# Patient Record
Sex: FEMALE | Race: WHITE | NOT HISPANIC OR LATINO | Employment: STUDENT | URBAN - METROPOLITAN AREA
[De-identification: names, ages, dates, MRNs, and addresses within clinical notes are randomized per-mention and may not be internally consistent; named-entity substitution may affect disease eponyms.]

---

## 2017-08-01 ENCOUNTER — OFFICE VISIT (OUTPATIENT)
Dept: FAMILY MEDICINE CLINIC | Facility: CLINIC | Age: 22
End: 2017-08-01

## 2017-08-01 VITALS
BODY MASS INDEX: 21.46 KG/M2 | SYSTOLIC BLOOD PRESSURE: 118 MMHG | TEMPERATURE: 98.2 F | HEIGHT: 62 IN | DIASTOLIC BLOOD PRESSURE: 72 MMHG | WEIGHT: 116.6 LBS | HEART RATE: 90 BPM | OXYGEN SATURATION: 96 %

## 2017-08-01 DIAGNOSIS — S51.851A HUMAN BITE OF FOREARM, RIGHT, INITIAL ENCOUNTER: Primary | ICD-10-CM
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
## 2017-08-01 PROCEDURE — 99213 OFFICE O/P EST LOW 20 MIN: CPT | Performed by: NURSE PRACTITIONER

## 2017-08-01 RX ORDER — CLINDAMYCIN HYDROCHLORIDE 300 MG/1
300 CAPSULE ORAL 3 TIMES DAILY
Qty: 21 CAPSULE | Refills: 0 | Status: SHIPPED | OUTPATIENT
Start: 2017-08-01 | End: 2017-08-08

## 2017-08-01 RX ORDER — CIPROFLOXACIN 500 MG/1
500 TABLET, FILM COATED ORAL 2 TIMES DAILY
Qty: 10 TABLET | Refills: 0 | Status: SHIPPED | OUTPATIENT
Start: 2017-08-01 | End: 2017-08-06

## 2017-08-01 NOTE — PROGRESS NOTES
Subjective   Alla Waldron is a 21 y.o. female.     History of Present Illness   Alla is here today with complaint of a bite wound to right forearm from a child at  yesterday at 1pm. There were visible teeth marks and redness, but no bleeding. States she is going out of town and wants to be treated because the last person that was bit by this child developed a staph infection from the bite.  Patient states she had a tetanus vaccine in January 2017.  The following portions of the patient's history were reviewed and updated as appropriate: allergies, current medications, past family history, past medical history, past social history, past surgical history and problem list.    Review of Systems   Constitutional: Negative for chills and fever.   Respiratory: Negative for shortness of breath.    Cardiovascular: Negative for chest pain.   Musculoskeletal: Negative for arthralgias, joint swelling, neck pain and neck stiffness.   Skin: Positive for wound. Negative for color change, pallor and rash.   Neurological: Negative for dizziness, light-headedness and headaches.   Psychiatric/Behavioral: The patient is not nervous/anxious.        Objective   Physical Exam   Constitutional: She is oriented to person, place, and time. She appears well-developed and well-nourished. No distress.   HENT:   Head: Normocephalic and atraumatic.   Right Ear: External ear normal.   Left Ear: External ear normal.   Eyes: No scleral icterus.   Neck: Normal range of motion. Neck supple.   Cardiovascular: Normal rate, regular rhythm and normal heart sounds.    No murmur heard.  Pulmonary/Chest: Effort normal and breath sounds normal. No respiratory distress.   Musculoskeletal: She exhibits no edema, tenderness or deformity.   Neurological: She is alert and oriented to person, place, and time.   Skin: Skin is warm and dry. There is erythema.   Right forearm with 3 teeth marks, sl. erythema and bruising around bite marks, skin does not  appear broken   Psychiatric: She has a normal mood and affect. Her behavior is normal. Judgment and thought content normal.   Vitals reviewed.      Assessment/Plan   Alla was seen today for child bite.    Diagnoses and all orders for this visit:    Human bite of forearm, right, initial encounter  -     ciprofloxacin (CIPRO) 500 MG tablet; Take 1 tablet by mouth 2 (Two) Times a Day for 5 days.  -     clindamycin (CLEOCIN) 300 MG capsule; Take 1 capsule by mouth 3 (Three) Times a Day for 7 days.        Since patient is going out of town, I did prescribe cipro and clindamycin as recommended for human bite wounds in PCN allergic patients. I discussed the side effects of these antibiotics, which does include C. Diff infection. Patient may choose to hold off on taking them if no signs of wound infection develop since it is difficult to see any break in the skin. I advised patient to come in for follow-up if signs of infection do develop and antibiotics don't help, or if fever and chills occur.    Patient advised to use backup birth control method during antibiotic use and 7 days after.   Patient was encouraged to keep me informed of any acute changes, lack of improvement, or any new concerning symptoms.  Patient verbalized understanding of instructions and denied further questions.

## 2017-09-07 ENCOUNTER — OFFICE VISIT (OUTPATIENT)
Dept: FAMILY MEDICINE CLINIC | Facility: CLINIC | Age: 22
End: 2017-09-07

## 2017-09-07 VITALS
OXYGEN SATURATION: 99 % | TEMPERATURE: 97.4 F | BODY MASS INDEX: 21.38 KG/M2 | HEIGHT: 62 IN | DIASTOLIC BLOOD PRESSURE: 76 MMHG | WEIGHT: 116.2 LBS | HEART RATE: 68 BPM | SYSTOLIC BLOOD PRESSURE: 102 MMHG

## 2017-09-07 DIAGNOSIS — Z01.89 VISIT FOR LABORATORY TEST: ICD-10-CM

## 2017-09-07 DIAGNOSIS — R20.0 NUMBNESS IN RIGHT LEG: Primary | ICD-10-CM

## 2017-09-07 DIAGNOSIS — R20.2 NUMBNESS AND TINGLING IN LEFT ARM: ICD-10-CM

## 2017-09-07 DIAGNOSIS — R20.0 NUMBNESS AND TINGLING IN LEFT ARM: ICD-10-CM

## 2017-09-07 PROCEDURE — 99213 OFFICE O/P EST LOW 20 MIN: CPT | Performed by: NURSE PRACTITIONER

## 2017-09-07 RX ORDER — LORATADINE 10 MG/1
CAPSULE, LIQUID FILLED ORAL
COMMUNITY

## 2017-09-07 NOTE — PROGRESS NOTES
Chief Complaint   Patient presents with   • Tingling     In left arm & right leg       Subjective   Alla Waldron is a 22 y.o. female    History of Present Illness  Patient with c/o tingling sensation in Left arm-started yesterday, Like when arm is asleep and hard to awaken. Occurred all day. Denies hx of neck pain or injuries.   Also with right leg tingling off and on for past 1-2 weeks, lasting 2-3 hours at a time. Denies any recent Injuries, MVA, etc. Taken no meds for this. Tried to massage arm, helped some.     Allergies   Allergen Reactions   • Penicillins      Past Medical History:   Diagnosis Date   • Depression     for 10 mths in 2014      Past Surgical History:   Procedure Laterality Date   • WISDOM TOOTH EXTRACTION  01/01/2014     Social History     Social History   • Marital status:      Spouse name: N/A   • Number of children: N/A   • Years of education: N/A     Occupational History   • Not on file.     Social History Main Topics   • Smoking status: Never Smoker   • Smokeless tobacco: Never Used   • Alcohol use Yes      Comment: occasionally   • Drug use: No   • Sexual activity: Yes     Partners: Male     Birth control/ protection: Pill     Other Topics Concern   • Not on file     Social History Narrative        Current Outpatient Prescriptions:   •  Loratadine (CLARITIN) 10 MG capsule, Take  by mouth., Disp: , Rfl:   •  Norethin Ace-Eth Estrad-FE (MINASTRIN 24 FE PO), Take  by mouth Daily., Disp: , Rfl:      Review of Systems   Constitutional: Negative for chills and fever.   Respiratory: Negative for cough, shortness of breath and wheezing.    Cardiovascular: Negative for chest pain and palpitations.   Gastrointestinal: Negative for constipation, diarrhea, nausea and vomiting.   Genitourinary: Negative for difficulty urinating and dysuria.   Musculoskeletal: Negative for arthralgias, back pain and neck pain.   Neurological: Positive for numbness. Negative for light-headedness and headaches.    Psychiatric/Behavioral: Negative for sleep disturbance.       Objective     Vitals:    09/07/17 1106   BP: 102/76   Pulse: 68   Temp: 97.4 °F (36.3 °C)   SpO2: 99%       No results found for this or any previous visit.    Physical Exam   Constitutional: She is oriented to person, place, and time. She appears well-developed and well-nourished.   HENT:   Head: Normocephalic.   Eyes: EOM are normal. Pupils are equal, round, and reactive to light.   Neck: Normal range of motion. Neck supple. No muscular tenderness present. Normal range of motion present.   Cardiovascular: Normal rate, regular rhythm, normal heart sounds and intact distal pulses.    Pulmonary/Chest: Effort normal and breath sounds normal.   Neurological: She is alert and oriented to person, place, and time. She has normal strength and normal reflexes. No cranial nerve deficit or sensory deficit.   Skin: Skin is warm and dry.   Psychiatric: She has a normal mood and affect. Her behavior is normal.   Vitals reviewed.      Assessment/Plan   Problem List Items Addressed This Visit        Nervous and Auditory    Numbness and tingling in left arm    Numbness in right leg - Primary      Other Visit Diagnoses     Visit for laboratory test        Relevant Orders    Comprehensive Metabolic Panel    Lipid Panel      Discussed numbness in different extremities, reviewed family history, no history noted of premature ASHD. Will trial of ALEVE 2 tabs bid with food, and recommend exercise. Will check lipids and CMP since we have never checked.    Patient was encouraged to keep me informed of any acute changes, lack of improvement, or any new concerning symptoms.Plan of care discussed with pt. They verbalized understanding and agreement.   Counseling provided on Neck Exercises.

## 2017-09-07 NOTE — PATIENT INSTRUCTIONS
Neck Exercises  Neck exercises can be important for many reasons:   · They can help you to improve and maintain flexibility in your neck. This can be especially important as you age.  · They can help to make your neck stronger. This can make movement easier.  · They can reduce or prevent neck pain.  · They may help your upper back.  Ask your health care provider which neck exercises would be best for you.  EXERCISES TO IMPROVE NECK FLEXIBILITY  Neck Stretch  Repeat this exercise 3-5 times.   1. Do this exercise while standing or while sitting in a chair.  2. Place your feet flat on the floor, shoulder-width apart.  3. Slowly turn your head to the right. Turn it all the way to the right so you can look over your right shoulder. Do not tilt or tip your head.  4. Hold this position for 10-30 seconds.  5. Slowly turn your head to the left, to look over your left shoulder.  6. Hold this position for 10-30 seconds.  Neck Retraction  Repeat this exercise 8-10 times. Do this 3-4 times a day or as told by your health care provider.  1. Do this exercise while standing or while sitting in a sturdy chair.  2. Look straight ahead. Do not bend your neck.  3. Use your fingers to push your chin backward. Do not bend your neck for this movement. Continue to face straight ahead. If you are doing the exercise properly, you will feel a slight sensation in your throat and a stretch at the back of your neck.  4. Hold the stretch for 1-2 seconds. Relax and repeat.  EXERCISES TO IMPROVE NECK STRENGTH  Neck Press   Repeat this exercise 10 times. Do it first thing in the morning and right before bed or as told by your health care provider.  1. Lie on your back on a firm bed or on the floor with a pillow under your head.  2. Use your neck muscles to push your head down on the pillow and straighten your spine.  3. Hold the position as well as you can. Keep your head facing up and your chin tucked.  4. Slowly count to 5 while holding this  position.  5. Relax for a few seconds. Then repeat.  Isometric Strengthening  Do a full set of these exercises 2 times a day or as told by your health care provider.  1. Sit in a supportive chair and place your hand on your forehead.  2. Push forward with your head and neck while pushing back with your hand. Hold for 10 seconds.  3. Relax. Then repeat the exercise 3 times.  4. Next, do the sequence again, this time putting your hand against the back of your head. Use your head and neck to push backward against the hand pressure.  5. Finally, do the same exercise on either side of your head, pushing sideways against the pressure of your hand.  Prone Head Lifts  Repeat this exercise 5 times. Do this 2 times a day or as told by your health care provider.  1. Lie face-down, resting on your elbows so that your chest and upper back are raised.  2. Start with your head facing downward, near your chest. Position your chin either on or near your chest.  3. Slowly lift your head upward. Lift until you are looking straight ahead. Then continue lifting your head as far back as you can stretch.  4. Hold your head up for 5 seconds. Then slowly lower it to your starting position.  Supine Head Lifts  Repeat this exercise 8-10 times. Do this 2 times a day or as told by your health care provider.  1. Lie on your back, bending your knees to point to the ceiling and keeping your feet flat on the floor.  2. Lift your head slowly off the floor, raising your chin toward your chest.  3. Hold for 5 seconds.  4. Relax and repeat.  Scapular Retraction  Repeat this exercise 5 times. Do this 2 times a day or as told by your health care provider.   1. Stand with your arms at your sides. Look straight ahead.  2. Slowly pull both shoulders backward and downward until you feel a stretch between your shoulder blades in your upper back.  3. Hold for 10-30 seconds.  4. Relax and repeat.  SEEK MEDICAL CARE IF:  · Your neck pain or discomfort gets much  worse when you do an exercise.  · Your neck pain or discomfort does not improve within 2 hours after you exercise.  If you have any of these problems, stop exercising right away. Do not do the exercises again unless your health care provider says that you can.  SEEK IMMEDIATE MEDICAL CARE IF:  · You develop sudden, severe neck pain. If this happens, stop exercising right away. Do not do the exercises again unless your health care provider says that you can.     This information is not intended to replace advice given to you by your health care provider. Make sure you discuss any questions you have with your health care provider.     Document Released: 11/28/2016 Document Reviewed: 11/28/2016  [employer] Interactive Patient Education ©2017 [employer]

## 2018-04-23 ENCOUNTER — TRANSCRIBE ORDERS (OUTPATIENT)
Dept: ADMINISTRATIVE | Facility: HOSPITAL | Age: 23
End: 2018-04-23

## 2018-04-23 DIAGNOSIS — N63.10 BREAST MASS, RIGHT: Primary | ICD-10-CM

## 2018-04-23 DIAGNOSIS — N63.11 BREAST LUMP ON RIGHT SIDE AT 11 O'CLOCK POSITION: ICD-10-CM

## 2018-04-26 ENCOUNTER — HOSPITAL ENCOUNTER (OUTPATIENT)
Dept: ULTRASOUND IMAGING | Facility: HOSPITAL | Age: 23
Discharge: HOME | End: 2018-04-26
Admitting: PHYSICIAN ASSISTANT

## 2018-04-26 DIAGNOSIS — N63.10 BREAST MASS, RIGHT: ICD-10-CM

## 2018-04-26 DIAGNOSIS — N63.11 BREAST LUMP ON RIGHT SIDE AT 11 O'CLOCK POSITION: ICD-10-CM

## 2018-04-26 PROCEDURE — 76642 ULTRASOUND BREAST LIMITED: CPT | Performed by: RADIOLOGY

## 2018-04-26 PROCEDURE — 76642 ULTRASOUND BREAST LIMITED: CPT

## 2018-11-06 ENCOUNTER — OFFICE VISIT (OUTPATIENT)
Dept: FAMILY MEDICINE CLINIC | Facility: CLINIC | Age: 23
End: 2018-11-06

## 2018-11-06 VITALS
WEIGHT: 120.8 LBS | BODY MASS INDEX: 22.09 KG/M2 | SYSTOLIC BLOOD PRESSURE: 96 MMHG | OXYGEN SATURATION: 98 % | HEART RATE: 92 BPM | TEMPERATURE: 98.2 F | DIASTOLIC BLOOD PRESSURE: 70 MMHG

## 2018-11-06 DIAGNOSIS — J02.9 SORE THROAT: Primary | ICD-10-CM

## 2018-11-06 LAB
EXPIRATION DATE: NORMAL
INTERNAL CONTROL: NORMAL
Lab: NORMAL
S PYO AG THROAT QL: NEGATIVE

## 2018-11-06 PROCEDURE — 99213 OFFICE O/P EST LOW 20 MIN: CPT | Performed by: NURSE PRACTITIONER

## 2018-11-06 PROCEDURE — 87880 STREP A ASSAY W/OPTIC: CPT | Performed by: NURSE PRACTITIONER

## 2018-11-06 RX ORDER — CEFDINIR 300 MG/1
300 CAPSULE ORAL 2 TIMES DAILY
Qty: 14 CAPSULE | Refills: 0 | Status: SHIPPED | OUTPATIENT
Start: 2018-11-06 | End: 2020-10-09

## 2018-11-06 NOTE — PROGRESS NOTES
Subjective   Alla Waldron is a 23 y.o. female.   Chief Complaint   Patient presents with   • Sore Throat     Started yesterday       History of Present Illness   Patient is here with complaint of sore throat, started yesterdayalso had a low grade temp this morning, has not taken anything for it. States a couple of people at work had Strep throat.   The following portions of the patient's history were reviewed and updated as appropriate: allergies, current medications, past family history, past medical history, past social history, past surgical history and problem list.    Review of Systems   Constitutional: Positive for fatigue and fever.   HENT: Positive for ear pain (below ears at lymph nodes) and sore throat. Negative for congestion, rhinorrhea, sinus pain, trouble swallowing and voice change.    Respiratory: Negative for apnea, cough and shortness of breath.    Gastrointestinal: Negative for nausea.   Skin: Negative.    Neurological: Positive for headaches. Negative for dizziness and light-headedness.       Objective   Physical Exam   Constitutional: She is oriented to person, place, and time. She appears well-developed and well-nourished. No distress.   HENT:   Head: Normocephalic and atraumatic.   Right Ear: External ear normal.   Left Ear: External ear normal.   Mouth/Throat: Uvula is midline. Posterior oropharyngeal erythema present. No oropharyngeal exudate, posterior oropharyngeal edema or tonsillar abscesses.   Eyes: Conjunctivae are normal. No scleral icterus.   Cardiovascular: Normal rate and regular rhythm.    No murmur heard.  Pulmonary/Chest: Effort normal and breath sounds normal. No respiratory distress. She has no wheezes.   Lymphadenopathy:     She has cervical adenopathy.   Neurological: She is alert and oriented to person, place, and time.   Skin: She is not diaphoretic.   Psychiatric: She has a normal mood and affect. Her behavior is normal. Judgment and thought content normal.   Vitals  reviewed.      Assessment/Plan   Alla was seen today for sore throat.    Diagnoses and all orders for this visit:    Sore throat  -     POC Rapid Strep A  -     cefdinir (OMNICEF) 300 MG capsule; Take 1 capsule by mouth 2 (Two) Times a Day.          Results for orders placed or performed in visit on 11/06/18   POC Rapid Strep A   Result Value Ref Range    Rapid Strep A Screen Negative Negative, VALID, INVALID, Not Performed    Internal Control Passed Passed    Lot Number [id number]     Expiration Date 02/28/2019      Rapid strep negative, advised salt water gargles, hot tea with honey and lemon or cold drinks and foods if more soothing. Antibiotic prescription given, start only if no improvement in 2 days or if symptoms worsen. Advised back up birth control medthod if antibiotics are used.  Patient was encouraged to keep me informed of any acute changes, lack of improvement, or any new concerning symptoms.Patient voiced understanding of all instructions and denied further questions.

## 2018-11-06 NOTE — PATIENT INSTRUCTIONS
Sore Throat  When you have a sore throat, your throat may:  · Hurt.  · Burn.  · Feel irritated.  · Feel scratchy.    Many things can cause a sore throat, including:  · An infection.  · Allergies.  · Dryness in the air.  · Smoke or pollution.  · Gastroesophageal reflux disease (GERD).  · A tumor.    A sore throat can be the first sign of another sickness. It can happen with other problems, like coughing or a fever. Most sore throats go away without treatment.  Follow these instructions at home:  · Take over-the-counter medicines only as told by your doctor.  · Drink enough fluids to keep your pee (urine) clear or pale yellow.  · Rest when you feel you need to.  · To help with pain, try:  ? Sipping warm liquids, such as broth, herbal tea, or warm water.  ? Eating or drinking cold or frozen liquids, such as frozen ice pops.  ? Gargling with a salt-water mixture 3-4 times a day or as needed. To make a salt-water mixture, add ½-1 tsp of salt in 1 cup of warm water. Mix it until you cannot see the salt anymore.  ? Sucking on hard candy or throat lozenges.  ? Putting a cool-mist humidifier in your bedroom at night.  ? Sitting in the bathroom with the door closed for 5-10 minutes while you run hot water in the shower.  · Do not use any tobacco products, such as cigarettes, chewing tobacco, and e-cigarettes. If you need help quitting, ask your doctor.  Contact a doctor if:  · You have a fever for more than 2-3 days.  · You keep having symptoms for more than 2-3 days.  · Your throat does not get better in 7 days.  · You have a fever and your symptoms suddenly get worse.  Get help right away if:  · You have trouble breathing.  · You cannot swallow fluids, soft foods, or your saliva.  · You have swelling in your throat or neck that gets worse.  · You keep feeling like you are going to throw up (vomit).  · You keep throwing up.  This information is not intended to replace advice given to you by your health care provider. Make  sure you discuss any questions you have with your health care provider.  Document Released: 09/26/2009 Document Revised: 08/13/2017 Document Reviewed: 10/07/2016  [employer] Interactive Patient Education © 2018 [employer]    Start antibiotic if symptoms worsen or do not improve in 2 days  Drink plenty of fluids, salt water gargles, tea with lemon and honey  Use alternate birth control/condoms while on antibiotics and for 7 days after since antibiotics can reduce effectiveness of birth control pills

## 2020-10-09 ENCOUNTER — OFFICE VISIT (OUTPATIENT)
Dept: FAMILY MEDICINE CLINIC | Facility: CLINIC | Age: 25
End: 2020-10-09

## 2020-10-09 ENCOUNTER — LAB (OUTPATIENT)
Dept: LAB | Facility: HOSPITAL | Age: 25
End: 2020-10-09

## 2020-10-09 VITALS
DIASTOLIC BLOOD PRESSURE: 80 MMHG | HEART RATE: 73 BPM | TEMPERATURE: 97.1 F | SYSTOLIC BLOOD PRESSURE: 120 MMHG | BODY MASS INDEX: 22.45 KG/M2 | WEIGHT: 122 LBS | OXYGEN SATURATION: 99 % | HEIGHT: 62 IN

## 2020-10-09 DIAGNOSIS — Z11.1 SCREENING-PULMONARY TB: Primary | ICD-10-CM

## 2020-10-09 DIAGNOSIS — R79.89 ELEVATED TSH: ICD-10-CM

## 2020-10-09 DIAGNOSIS — Z11.59 ENCOUNTER FOR HEPATITIS C SCREENING TEST FOR LOW RISK PATIENT: ICD-10-CM

## 2020-10-09 DIAGNOSIS — Z00.00 HEALTHCARE MAINTENANCE: ICD-10-CM

## 2020-10-09 DIAGNOSIS — Z23 NEED FOR TETANUS, DIPHTHERIA, AND ACELLULAR PERTUSSIS (TDAP) VACCINE: ICD-10-CM

## 2020-10-09 DIAGNOSIS — Z11.1 SCREENING-PULMONARY TB: ICD-10-CM

## 2020-10-09 LAB
25(OH)D3 SERPL-MCNC: 40.9 NG/ML (ref 30–100)
ALBUMIN SERPL-MCNC: 4.5 G/DL (ref 3.5–5.2)
ALBUMIN/GLOB SERPL: 1.6 G/DL
ALP SERPL-CCNC: 55 U/L (ref 39–117)
ALT SERPL W P-5'-P-CCNC: 18 U/L (ref 1–33)
ANION GAP SERPL CALCULATED.3IONS-SCNC: 11.1 MMOL/L (ref 5–15)
AST SERPL-CCNC: 20 U/L (ref 1–32)
BASOPHILS # BLD AUTO: 0.03 10*3/MM3 (ref 0–0.2)
BASOPHILS NFR BLD AUTO: 0.5 % (ref 0–1.5)
BILIRUB SERPL-MCNC: 1.4 MG/DL (ref 0–1.2)
BUN SERPL-MCNC: 11 MG/DL (ref 6–20)
BUN/CREAT SERPL: 12 (ref 7–25)
CALCIUM SPEC-SCNC: 9.6 MG/DL (ref 8.6–10.5)
CHLORIDE SERPL-SCNC: 102 MMOL/L (ref 98–107)
CHOLEST SERPL-MCNC: 157 MG/DL (ref 0–200)
CO2 SERPL-SCNC: 24.9 MMOL/L (ref 22–29)
CREAT SERPL-MCNC: 0.92 MG/DL (ref 0.57–1)
DEPRECATED RDW RBC AUTO: 37.1 FL (ref 37–54)
EOSINOPHIL # BLD AUTO: 0.13 10*3/MM3 (ref 0–0.4)
EOSINOPHIL NFR BLD AUTO: 2.2 % (ref 0.3–6.2)
ERYTHROCYTE [DISTWIDTH] IN BLOOD BY AUTOMATED COUNT: 11.5 % (ref 12.3–15.4)
GFR SERPL CREATININE-BSD FRML MDRD: 74 ML/MIN/1.73
GLOBULIN UR ELPH-MCNC: 2.9 GM/DL
GLUCOSE SERPL-MCNC: 78 MG/DL (ref 65–99)
HCT VFR BLD AUTO: 43.4 % (ref 34–46.6)
HCV AB SER DONR QL: NORMAL
HDLC SERPL-MCNC: 47 MG/DL (ref 40–60)
HGB BLD-MCNC: 15.2 G/DL (ref 12–15.9)
IMM GRANULOCYTES # BLD AUTO: 0.01 10*3/MM3 (ref 0–0.05)
IMM GRANULOCYTES NFR BLD AUTO: 0.2 % (ref 0–0.5)
LDLC SERPL CALC-MCNC: 92 MG/DL (ref 0–100)
LDLC/HDLC SERPL: 1.93 {RATIO}
LYMPHOCYTES # BLD AUTO: 2.28 10*3/MM3 (ref 0.7–3.1)
LYMPHOCYTES NFR BLD AUTO: 39 % (ref 19.6–45.3)
MCH RBC QN AUTO: 31.3 PG (ref 26.6–33)
MCHC RBC AUTO-ENTMCNC: 35 G/DL (ref 31.5–35.7)
MCV RBC AUTO: 89.5 FL (ref 79–97)
MONOCYTES # BLD AUTO: 0.32 10*3/MM3 (ref 0.1–0.9)
MONOCYTES NFR BLD AUTO: 5.5 % (ref 5–12)
NEUTROPHILS NFR BLD AUTO: 3.08 10*3/MM3 (ref 1.7–7)
NEUTROPHILS NFR BLD AUTO: 52.6 % (ref 42.7–76)
NRBC BLD AUTO-RTO: 0 /100 WBC (ref 0–0.2)
PLATELET # BLD AUTO: 228 10*3/MM3 (ref 140–450)
PMV BLD AUTO: 11.3 FL (ref 6–12)
POTASSIUM SERPL-SCNC: 3.8 MMOL/L (ref 3.5–5.2)
PROT SERPL-MCNC: 7.4 G/DL (ref 6–8.5)
RBC # BLD AUTO: 4.85 10*6/MM3 (ref 3.77–5.28)
SODIUM SERPL-SCNC: 138 MMOL/L (ref 136–145)
TRIGL SERPL-MCNC: 97 MG/DL (ref 0–150)
TSH SERPL DL<=0.05 MIU/L-ACNC: 5.33 UIU/ML (ref 0.27–4.2)
VLDLC SERPL-MCNC: 18 MG/DL (ref 5–40)
WBC # BLD AUTO: 5.85 10*3/MM3 (ref 3.4–10.8)

## 2020-10-09 PROCEDURE — 99212 OFFICE O/P EST SF 10 MIN: CPT | Performed by: NURSE PRACTITIONER

## 2020-10-09 PROCEDURE — 86481 TB AG RESPONSE T-CELL SUSP: CPT

## 2020-10-09 PROCEDURE — 80053 COMPREHEN METABOLIC PANEL: CPT

## 2020-10-09 PROCEDURE — 80061 LIPID PANEL: CPT

## 2020-10-09 PROCEDURE — 84439 ASSAY OF FREE THYROXINE: CPT

## 2020-10-09 PROCEDURE — 85025 COMPLETE CBC W/AUTO DIFF WBC: CPT

## 2020-10-09 PROCEDURE — 82306 VITAMIN D 25 HYDROXY: CPT

## 2020-10-09 PROCEDURE — 86803 HEPATITIS C AB TEST: CPT

## 2020-10-09 PROCEDURE — 84443 ASSAY THYROID STIM HORMONE: CPT

## 2020-10-09 PROCEDURE — 90471 IMMUNIZATION ADMIN: CPT | Performed by: NURSE PRACTITIONER

## 2020-10-09 PROCEDURE — 90715 TDAP VACCINE 7 YRS/> IM: CPT | Performed by: NURSE PRACTITIONER

## 2020-10-09 RX ORDER — DESOGESTREL AND ETHINYL ESTRADIOL 0.15-0.03
KIT ORAL
COMMUNITY
Start: 2020-07-15

## 2020-10-09 NOTE — PATIENT INSTRUCTIONS
Preventive Care 21-39 Years Old, Female  Preventive care refers to visits with your health care provider and lifestyle choices that can promote health and wellness. This includes:  · A yearly physical exam. This may also be called an annual well check.  · Regular dental visits and eye exams.  · Immunizations.  · Screening for certain conditions.  · Healthy lifestyle choices, such as eating a healthy diet, getting regular exercise, not using drugs or products that contain nicotine and tobacco, and limiting alcohol use.  What can I expect for my preventive care visit?  Physical exam  Your health care provider will check your:  · Height and weight. This may be used to calculate body mass index (BMI), which tells if you are at a healthy weight.  · Heart rate and blood pressure.  · Skin for abnormal spots.  Counseling  Your health care provider may ask you questions about your:  · Alcohol, tobacco, and drug use.  · Emotional well-being.  · Home and relationship well-being.  · Sexual activity.  · Eating habits.  · Work and work environment.  · Method of birth control.  · Menstrual cycle.  · Pregnancy history.  What immunizations do I need?    Influenza (flu) vaccine  · This is recommended every year.  Tetanus, diphtheria, and pertussis (Tdap) vaccine  · You may need a Td booster every 10 years.  Varicella (chickenpox) vaccine  · You may need this if you have not been vaccinated.  Human papillomavirus (HPV) vaccine  · If recommended by your health care provider, you may need three doses over 6 months.  Measles, mumps, and rubella (MMR) vaccine  · You may need at least one dose of MMR. You may also need a second dose.  Meningococcal conjugate (MenACWY) vaccine  · One dose is recommended if you are age 19-21 years and a first-year college student living in a residence [place], or if you have one of several medical conditions. You may also need additional booster doses.  Pneumococcal conjugate (PCV13) vaccine  · You may need  this if you have certain conditions and were not previously vaccinated.  Pneumococcal polysaccharide (PPSV23) vaccine  · You may need one or two doses if you smoke cigarettes or if you have certain conditions.  Hepatitis A vaccine  · You may need this if you have certain conditions or if you travel or work in places where you may be exposed to hepatitis A.  Hepatitis B vaccine  · You may need this if you have certain conditions or if you travel or work in places where you may be exposed to hepatitis B.  Haemophilus influenzae type b (Hib) vaccine  · You may need this if you have certain conditions.  You may receive vaccines as individual doses or as more than one vaccine together in one shot (combination vaccines). Talk with your health care provider about the risks and benefits of combination vaccines.  What tests do I need?    Blood tests  · Lipid and cholesterol levels. These may be checked every 5 years starting at age 20.  · Hepatitis C test.  · Hepatitis B test.  Screening  · Diabetes screening. This is done by checking your blood sugar (glucose) after you have not eaten for a while (fasting).  · Sexually transmitted disease (STD) testing.  · BRCA-related cancer screening. This may be done if you have a family history of breast, ovarian, tubal, or peritoneal cancers.  · Pelvic exam and Pap test. This may be done every 3 years starting at age 21. Starting at age 30, this may be done every 5 years if you have a Pap test in combination with an HPV test.  Talk with your health care provider about your test results, treatment options, and if necessary, the need for more tests.  Follow these instructions at home:  Eating and drinking    · Eat a diet that includes fresh fruits and vegetables, whole grains, lean protein, and low-fat dairy.  · Take vitamin and mineral supplements as recommended by your health care provider.  · Do not drink alcohol if:  ? Your health care provider tells you not to drink.  ? You are  pregnant, may be pregnant, or are planning to become pregnant.  · If you drink alcohol:  ? Limit how much you have to 0-1 drink a day.  ? Be aware of how much alcohol is in your drink. In the [place] one drink equals one 12 oz bottle of beer (355 mL), one 5 oz glass of wine (148 mL), or one 1½ oz glass of hard liquor (44 mL).  Lifestyle  · Take daily care of your teeth and gums.  · Stay active. Exercise for at least 30 minutes on 5 or more days each week.  · Do not use any products that contain nicotine or tobacco, such as cigarettes, e-cigarettes, and chewing tobacco. If you need help quitting, ask your health care provider.  · If you are sexually active, practice safe sex. Use a condom or other form of birth control (contraception) in order to prevent pregnancy and STIs (sexually transmitted infections). If you plan to become pregnant, see your health care provider for a preconception visit.  What's next?  · Visit your health care provider once a year for a well check visit.  · Ask your health care provider how often you should have your eyes and teeth checked.  · Stay up to date on all vaccines.  This information is not intended to replace advice given to you by your health care provider. Make sure you discuss any questions you have with your health care provider.  Document Released: 02/13/2003 Document Revised: 08/29/2019 Document Reviewed: 08/29/2019  [employer] Patient Education © 2020 [employer]

## 2020-10-09 NOTE — PROGRESS NOTES
Subjective   Alla Waldron is a 25 y.o. female.   Chief Complaint   Patient presents with   • TB Test     She is requesting Quanteferon lab test to check for tuberculosis, requirement for school      Answers for HPI/ROS submitted by the patient on 10/2/2020   What is the primary reason for your visit?: Other  Please describe your symptoms.: No symptoms, I just need an updated TB test and tetanus shot  Have you had these symptoms before?: No  How long have you been having these symptoms?: 1-4 days  Please list any medications you are currently taking for this condition.: claritin qd, apri qd      History of Present Illness   Patient is here for TB screening and routine labs. 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 She also needs to update her Tdap vaccination. Computer entry shows it was given in 2017, she does not have that record and does not recall getting the vaccine. so needs to go ahead and get today.   Eats healthy diet, gets regular exercise. Denies health concerns. Denies history of positive TB testing.    The following portions of the patient's history were reviewed and updated as appropriate: allergies, current medications, past family history, past medical history, past social history, past surgical history and problem list.    Review of Systems   Constitutional: Negative for activity change, appetite change, chills, diaphoresis, fatigue, fever and unexpected weight change.   Eyes: Negative for visual disturbance.   Respiratory: Negative for cough, shortness of breath and wheezing.    Cardiovascular: Negative for chest pain, palpitations and leg swelling.   Gastrointestinal: Negative for abdominal pain, blood in stool, constipation and diarrhea.   Endocrine: Negative for cold intolerance and heat intolerance.   Genitourinary: Negative for difficulty urinating, dysuria and menstrual problem.   Skin: Negative for rash and wound.   Neurological: Negative for dizziness, light-headedness, numbness and  headaches.   Psychiatric/Behavioral: Negative for dysphoric mood and sleep disturbance. The patient is not nervous/anxious.        Objective   Physical Exam  Vitals signs reviewed.   Constitutional:       General: She is not in acute distress.     Appearance: Normal appearance. She is normal weight. She is not ill-appearing, toxic-appearing or diaphoretic.   HENT:      Head: Normocephalic and atraumatic.   Neck:      Musculoskeletal: Normal range of motion and neck supple.   Cardiovascular:      Rate and Rhythm: Normal rate and regular rhythm.      Pulses: Normal pulses.      Heart sounds: Normal heart sounds. No murmur.   Pulmonary:      Effort: Pulmonary effort is normal. No respiratory distress.      Breath sounds: Normal breath sounds. No wheezing.   Abdominal:      General: Abdomen is flat.      Palpations: Abdomen is soft.   Skin:     General: Skin is warm and dry.   Neurological:      Mental Status: She is alert and oriented to person, place, and time.   Psychiatric:         Mood and Affect: Mood normal.         Thought Content: Thought content normal.       /80   Pulse 73   Temp 97.1 °F (36.2 °C)   Wt 55.3 kg (122 lb)   SpO2 99%   BMI 22.31 kg/m²     Assessment/Plan   Alla was seen today for tb test.    Diagnoses and all orders for this visit:    Screening-pulmonary TB  -     Cancel: QuantiFERON TB Gold; Future  -     TSPOT; Future    Healthcare maintenance  -     CBC Auto Differential; Future  -     Comprehensive Metabolic Panel; Future  -     Lipid Panel; Future  -     Vitamin D 25 Hydroxy; Future  -     TSH; Future    Encounter for hepatitis C screening test for low risk patient  -     Hepatitis C Antibody; Future    Need for tetanus, diphtheria, and acellular pertussis (Tdap) vaccine  -     Tdap Vaccine Greater Than or Equal To 8yo IM      Tspot ordered for TB screening, will do Quantiferon if positive.  Tdap given today.  Screening labs ordered to evaluate chronic conditions. I will contact  patient regarding test results and provide instructions regarding any necessary changes in plan of care.  Patient was encouraged to keep me informed of any acute changes, lack of improvement, or any new concerning symptoms.

## 2020-10-10 LAB — T4 FREE SERPL-MCNC: 1.24 NG/DL (ref 0.93–1.7)

## 2020-10-11 LAB
TSPOT INTERPRETATION: NEGATIVE
TSPOT NIL CONTROL INTERPRETATION: NORMAL
TSPOT PANEL A: 0
TSPOT PANEL B: 1
TSPOT POS CONTROL INTERPRETATION: NORMAL

## 2022-01-28 ENCOUNTER — LAB (OUTPATIENT)
Dept: LAB | Facility: HOSPITAL | Age: 27
End: 2022-01-28

## 2022-01-28 ENCOUNTER — OFFICE VISIT (OUTPATIENT)
Dept: FAMILY MEDICINE CLINIC | Facility: CLINIC | Age: 27
End: 2022-01-28

## 2022-01-28 VITALS
WEIGHT: 123 LBS | DIASTOLIC BLOOD PRESSURE: 62 MMHG | OXYGEN SATURATION: 98 % | SYSTOLIC BLOOD PRESSURE: 110 MMHG | HEIGHT: 65 IN | BODY MASS INDEX: 20.49 KG/M2 | HEART RATE: 78 BPM

## 2022-01-28 DIAGNOSIS — R79.89 ELEVATED TSH: ICD-10-CM

## 2022-01-28 DIAGNOSIS — E04.1 SOLITARY THYROID NODULE: ICD-10-CM

## 2022-01-28 DIAGNOSIS — R55 VASOVAGAL NEAR SYNCOPE: ICD-10-CM

## 2022-01-28 DIAGNOSIS — Z00.00 HEALTHCARE MAINTENANCE: ICD-10-CM

## 2022-01-28 DIAGNOSIS — Z00.00 HEALTHCARE MAINTENANCE: Primary | ICD-10-CM

## 2022-01-28 DIAGNOSIS — E06.3 HYPOTHYROIDISM DUE TO HASHIMOTO'S THYROIDITIS: ICD-10-CM

## 2022-01-28 DIAGNOSIS — R17 ELEVATED BILIRUBIN: ICD-10-CM

## 2022-01-28 DIAGNOSIS — E55.9 VITAMIN D DEFICIENCY: ICD-10-CM

## 2022-01-28 DIAGNOSIS — R19.7 VOMITING AND DIARRHEA: ICD-10-CM

## 2022-01-28 DIAGNOSIS — E03.8 HYPOTHYROIDISM DUE TO HASHIMOTO'S THYROIDITIS: ICD-10-CM

## 2022-01-28 DIAGNOSIS — R11.10 VOMITING AND DIARRHEA: ICD-10-CM

## 2022-01-28 DIAGNOSIS — E06.3 THYROIDITIS, AUTOIMMUNE: ICD-10-CM

## 2022-01-28 LAB
25(OH)D3 SERPL-MCNC: 33.9 NG/ML (ref 30–100)
ALBUMIN SERPL-MCNC: 4.5 G/DL (ref 3.5–5.2)
ALBUMIN/GLOB SERPL: 1.6 G/DL
ALP SERPL-CCNC: 55 U/L (ref 39–117)
ALT SERPL W P-5'-P-CCNC: 11 U/L (ref 1–33)
ANION GAP SERPL CALCULATED.3IONS-SCNC: 12.3 MMOL/L (ref 5–15)
AST SERPL-CCNC: 17 U/L (ref 1–32)
BASOPHILS # BLD AUTO: 0.04 10*3/MM3 (ref 0–0.2)
BASOPHILS NFR BLD AUTO: 0.6 % (ref 0–1.5)
BILIRUB SERPL-MCNC: 2.6 MG/DL (ref 0–1.2)
BUN SERPL-MCNC: 12 MG/DL (ref 6–20)
BUN/CREAT SERPL: 13 (ref 7–25)
CALCIUM SPEC-SCNC: 9.8 MG/DL (ref 8.6–10.5)
CHLORIDE SERPL-SCNC: 105 MMOL/L (ref 98–107)
CHOLEST SERPL-MCNC: 148 MG/DL (ref 0–200)
CO2 SERPL-SCNC: 23.7 MMOL/L (ref 22–29)
CREAT SERPL-MCNC: 0.92 MG/DL (ref 0.57–1)
DEPRECATED RDW RBC AUTO: 40 FL (ref 37–54)
EOSINOPHIL # BLD AUTO: 0.11 10*3/MM3 (ref 0–0.4)
EOSINOPHIL NFR BLD AUTO: 1.5 % (ref 0.3–6.2)
ERYTHROCYTE [DISTWIDTH] IN BLOOD BY AUTOMATED COUNT: 11.7 % (ref 12.3–15.4)
GFR SERPL CREATININE-BSD FRML MDRD: 74 ML/MIN/1.73
GLOBULIN UR ELPH-MCNC: 2.9 GM/DL
GLUCOSE SERPL-MCNC: 78 MG/DL (ref 65–99)
HCT VFR BLD AUTO: 44.2 % (ref 34–46.6)
HDLC SERPL-MCNC: 50 MG/DL (ref 40–60)
HGB BLD-MCNC: 14.8 G/DL (ref 12–15.9)
IMM GRANULOCYTES # BLD AUTO: 0.02 10*3/MM3 (ref 0–0.05)
IMM GRANULOCYTES NFR BLD AUTO: 0.3 % (ref 0–0.5)
LDLC SERPL CALC-MCNC: 82 MG/DL (ref 0–100)
LDLC/HDLC SERPL: 1.61 {RATIO}
LYMPHOCYTES # BLD AUTO: 2.59 10*3/MM3 (ref 0.7–3.1)
LYMPHOCYTES NFR BLD AUTO: 35.9 % (ref 19.6–45.3)
MCH RBC QN AUTO: 30.8 PG (ref 26.6–33)
MCHC RBC AUTO-ENTMCNC: 33.5 G/DL (ref 31.5–35.7)
MCV RBC AUTO: 91.9 FL (ref 79–97)
MONOCYTES # BLD AUTO: 0.44 10*3/MM3 (ref 0.1–0.9)
MONOCYTES NFR BLD AUTO: 6.1 % (ref 5–12)
NEUTROPHILS NFR BLD AUTO: 4.01 10*3/MM3 (ref 1.7–7)
NEUTROPHILS NFR BLD AUTO: 55.6 % (ref 42.7–76)
NRBC BLD AUTO-RTO: 0 /100 WBC (ref 0–0.2)
PLATELET # BLD AUTO: 238 10*3/MM3 (ref 140–450)
PMV BLD AUTO: 11.4 FL (ref 6–12)
POTASSIUM SERPL-SCNC: 4.4 MMOL/L (ref 3.5–5.2)
PROT SERPL-MCNC: 7.4 G/DL (ref 6–8.5)
RBC # BLD AUTO: 4.81 10*6/MM3 (ref 3.77–5.28)
SODIUM SERPL-SCNC: 141 MMOL/L (ref 136–145)
T4 FREE SERPL-MCNC: 1.43 NG/DL (ref 0.93–1.7)
TRIGL SERPL-MCNC: 87 MG/DL (ref 0–150)
TSH SERPL DL<=0.05 MIU/L-ACNC: 7.08 UIU/ML (ref 0.27–4.2)
VLDLC SERPL-MCNC: 16 MG/DL (ref 5–40)
WBC NRBC COR # BLD: 7.21 10*3/MM3 (ref 3.4–10.8)

## 2022-01-28 PROCEDURE — 86376 MICROSOMAL ANTIBODY EACH: CPT

## 2022-01-28 PROCEDURE — 36415 COLL VENOUS BLD VENIPUNCTURE: CPT

## 2022-01-28 PROCEDURE — 80061 LIPID PANEL: CPT

## 2022-01-28 PROCEDURE — 80050 GENERAL HEALTH PANEL: CPT

## 2022-01-28 PROCEDURE — 84439 ASSAY OF FREE THYROXINE: CPT

## 2022-01-28 PROCEDURE — 82306 VITAMIN D 25 HYDROXY: CPT

## 2022-01-28 PROCEDURE — 99214 OFFICE O/P EST MOD 30 MIN: CPT | Performed by: NURSE PRACTITIONER

## 2022-01-29 DIAGNOSIS — E06.3 THYROIDITIS, AUTOIMMUNE: ICD-10-CM

## 2022-01-29 DIAGNOSIS — R17 ELEVATED BILIRUBIN: Primary | ICD-10-CM

## 2022-01-29 LAB — THYROPEROXIDASE AB SERPL-ACNC: 304 IU/ML (ref 0–34)

## 2022-01-29 RX ORDER — LEVOTHYROXINE SODIUM 0.05 MG/1
50 TABLET ORAL DAILY
Qty: 60 TABLET | Refills: 0 | Status: SHIPPED | OUTPATIENT
Start: 2022-01-29 | End: 2022-04-01

## 2022-02-08 ENCOUNTER — HOSPITAL ENCOUNTER (OUTPATIENT)
Dept: ULTRASOUND IMAGING | Facility: HOSPITAL | Age: 27
Discharge: HOME | End: 2022-02-08

## 2022-02-08 DIAGNOSIS — R79.89 ELEVATED TSH: ICD-10-CM

## 2022-02-08 DIAGNOSIS — R19.7 VOMITING AND DIARRHEA: ICD-10-CM

## 2022-02-08 DIAGNOSIS — R17 ELEVATED BILIRUBIN: ICD-10-CM

## 2022-02-08 DIAGNOSIS — E06.3 HYPOTHYROIDISM DUE TO HASHIMOTO'S THYROIDITIS: ICD-10-CM

## 2022-02-08 DIAGNOSIS — R11.10 VOMITING AND DIARRHEA: ICD-10-CM

## 2022-02-08 DIAGNOSIS — E03.8 HYPOTHYROIDISM DUE TO HASHIMOTO'S THYROIDITIS: ICD-10-CM

## 2022-02-08 PROCEDURE — 76536 US EXAM OF HEAD AND NECK: CPT

## 2022-02-08 PROCEDURE — 76705 ECHO EXAM OF ABDOMEN: CPT

## 2022-03-11 ENCOUNTER — LAB (OUTPATIENT)
Dept: LAB | Facility: HOSPITAL | Age: 27
End: 2022-03-11

## 2022-03-11 DIAGNOSIS — R17 ELEVATED BILIRUBIN: ICD-10-CM

## 2022-03-11 DIAGNOSIS — E06.3 THYROIDITIS, AUTOIMMUNE: ICD-10-CM

## 2022-03-11 LAB
BILIRUB CONJ SERPL-MCNC: 0.3 MG/DL (ref 0–0.3)
BILIRUB INDIRECT SERPL-MCNC: 1.2 MG/DL
BILIRUB SERPL-MCNC: 1.5 MG/DL (ref 0–1.2)
TSH SERPL DL<=0.05 MIU/L-ACNC: 2.56 UIU/ML (ref 0.27–4.2)

## 2022-03-11 PROCEDURE — 82247 BILIRUBIN TOTAL: CPT

## 2022-03-11 PROCEDURE — 82248 BILIRUBIN DIRECT: CPT

## 2022-03-11 PROCEDURE — 84443 ASSAY THYROID STIM HORMONE: CPT

## 2022-03-31 DIAGNOSIS — E06.3 HYPOTHYROIDISM DUE TO HASHIMOTO'S THYROIDITIS: ICD-10-CM

## 2022-03-31 DIAGNOSIS — E03.8 HYPOTHYROIDISM DUE TO HASHIMOTO'S THYROIDITIS: ICD-10-CM

## 2022-04-01 RX ORDER — LEVOTHYROXINE SODIUM 0.05 MG/1
TABLET ORAL
Qty: 30 TABLET | Refills: 2 | Status: SHIPPED | OUTPATIENT
Start: 2022-04-01 | End: 2022-07-01

## 2022-04-01 NOTE — TELEPHONE ENCOUNTER
Rx Refill Note  Requested Prescriptions     Pending Prescriptions Disp Refills   • levothyroxine (SYNTHROID, LEVOTHROID) 50 MCG tablet [Pharmacy Med Name: LEVOTHYROXINE 50 MCG TABLET] 30 tablet      Sig: TAKE ONE TABLET BY MOUTH DAILY      Last office visit with prescribing clinician: 1/28/2022      Next office visit with prescribing clinician: Visit date not found            Shena Oneill MA  04/01/22, 08:38 EDT

## 2022-05-16 ENCOUNTER — OFFICE VISIT (OUTPATIENT)
Dept: ENDOCRINOLOGY | Facility: CLINIC | Age: 27
End: 2022-05-16

## 2022-05-16 ENCOUNTER — LAB (OUTPATIENT)
Dept: LAB | Facility: HOSPITAL | Age: 27
End: 2022-05-16

## 2022-05-16 VITALS
HEIGHT: 62 IN | WEIGHT: 120 LBS | OXYGEN SATURATION: 98 % | DIASTOLIC BLOOD PRESSURE: 60 MMHG | SYSTOLIC BLOOD PRESSURE: 110 MMHG | BODY MASS INDEX: 22.08 KG/M2 | HEART RATE: 102 BPM

## 2022-05-16 DIAGNOSIS — E03.8 HYPOTHYROIDISM DUE TO HASHIMOTO'S THYROIDITIS: Primary | ICD-10-CM

## 2022-05-16 DIAGNOSIS — E06.3 HYPOTHYROIDISM DUE TO HASHIMOTO'S THYROIDITIS: Primary | ICD-10-CM

## 2022-05-16 DIAGNOSIS — R19.7 DIARRHEA, UNSPECIFIED TYPE: ICD-10-CM

## 2022-05-16 DIAGNOSIS — E04.1 SOLITARY THYROID NODULE: ICD-10-CM

## 2022-05-16 PROCEDURE — 86231 EMA EACH IG CLASS: CPT | Performed by: INTERNAL MEDICINE

## 2022-05-16 PROCEDURE — 84443 ASSAY THYROID STIM HORMONE: CPT | Performed by: INTERNAL MEDICINE

## 2022-05-16 PROCEDURE — 86364 TISS TRNSGLTMNASE EA IG CLAS: CPT | Performed by: INTERNAL MEDICINE

## 2022-05-16 PROCEDURE — 99204 OFFICE O/P NEW MOD 45 MIN: CPT | Performed by: INTERNAL MEDICINE

## 2022-05-16 PROCEDURE — 82784 ASSAY IGA/IGD/IGG/IGM EACH: CPT | Performed by: INTERNAL MEDICINE

## 2022-05-16 PROCEDURE — 86258 DGP ANTIBODY EACH IG CLASS: CPT | Performed by: INTERNAL MEDICINE

## 2022-05-16 PROCEDURE — 84439 ASSAY OF FREE THYROXINE: CPT | Performed by: INTERNAL MEDICINE

## 2022-05-16 NOTE — PROGRESS NOTES
Chief Complaint   Patient presents with   • Thyroid Problem        Referring Provider  Loren Siegel APRN HPI   Alla Waldron is a 26 y.o. female had concerns including Thyroid Problem.     Hypothyroidism was diagnosed the end of January.  Patient was having episodes of nausea and diarrhea.  PCP checked labs and TSH was elevated.  Recheck level a few months later confirmed hypothyroidism with positive TPO antibody.  Has some fatigue - is improved but not to baseline.     Has a history of constipation which is unchanged.   Last week had an episode of diarrhea persisting for days. Unsure if related to stress from school ([employer] school).  No prior known issue of celiac disease or gluten intolerance. Hasn't been screened for celiac disease.     Takes the levothyroxine in the morning with OCP, denies missed doses.   No immediate plans for children. May consider in the future.     Past Medical History:   Diagnosis Date   • Depression     for 10 mths in 2014   • Hashimoto's thyroiditis 1/28/22   • Hypothyroidism 10/19/20    Elevated TSH, but normal T4   • Solitary thyroid nodule 5/16/2022   • Thyroid nodule 2/8/22    11mm nodule left lobe     Past Surgical History:   Procedure Laterality Date   • WISDOM TOOTH EXTRACTION  01/01/2014      Family History   Problem Relation Age of Onset   • Depression Mother    • Hyperlipidemia Mother    • Thyroid disease Mother         hypothyroidism   • Hypertension Mother    • Depression Father    • Breast cancer Paternal Grandmother    • Ovarian cancer Neg Hx       Social History     Socioeconomic History   • Marital status:    Tobacco Use   • Smoking status: Never Smoker   • Smokeless tobacco: Never Used   Substance and Sexual Activity   • Alcohol use: Yes     Alcohol/week: 2.0 standard drinks     Types: 2 Glasses of wine per week     Comment: occasionally   • Drug use: No   • Sexual activity: Yes     Partners: Male     Birth control/protection: Condom, OCP      Allergies  "  Allergen Reactions   • Penicillins       Current Outpatient Medications on File Prior to Visit   Medication Sig Dispense Refill   • Apri 0.15-30 MG-MCG per tablet      • levothyroxine (SYNTHROID, LEVOTHROID) 50 MCG tablet TAKE ONE TABLET BY MOUTH DAILY 30 tablet 2   • Loratadine 10 MG capsule Take  by mouth.     • Multiple Vitamins-Minerals (MULTIVITAMIN ADULTS PO) Take  by mouth Daily.       No current facility-administered medications on file prior to visit.        Review of Systems   Constitutional: Positive for fatigue.   HENT: Positive for congestion, sinus pressure and sneezing.    Eyes: Negative.    Respiratory: Negative.    Cardiovascular: Negative.    Gastrointestinal: Negative.    Endocrine: Negative.    Genitourinary: Negative.    Musculoskeletal: Negative.    Skin: Negative.    Allergic/Immunologic: Positive for environmental allergies.   Neurological: Negative.    Hematological: Negative.    Psychiatric/Behavioral: Negative.         /60   Pulse 102   Ht 157.5 cm (62\")   Wt 54.4 kg (120 lb)   SpO2 98%   BMI 21.95 kg/m²      Physical Exam    Constitutional:  well developed; well nourished  no acute distress   ENT/Thyroid: no thyromegaly  no palpable nodules   Eyes: EOM intact  Conjunctiva: clear   Respiratory:  breathing is unlabored  clear to auscultation bilaterally   Cardiovascular:  regular rate and rhythm, S1, S2 normal, no murmur, click, rub or gallop   Chest:  Not performed.   Abdomen: Not performed.   : Not performed.   Musculoskeletal: negative findings:  ROM of all joints is normal, no deformities present   Skin: dry and warm   Neuro: normal without focal findings and mental status, speech normal, alert and oriented x3   Psych: oriented to time, place and person, mood and affect are within normal limits     Labs/Imaging  CMP  Lab Results   Component Value Date    GLUCOSE 78 01/28/2022    BUN 12 01/28/2022    CREATININE 0.92 01/28/2022    EGFRIFNONA 74 01/28/2022    BCR 13.0 " 01/28/2022    K 4.4 01/28/2022    CO2 23.7 01/28/2022    CALCIUM 9.8 01/28/2022    ALBUMIN 4.50 01/28/2022    AST 17 01/28/2022    ALT 11 01/28/2022        CBC w/DIFF   Lab Results   Component Value Date    WBC 7.21 01/28/2022    RBC 4.81 01/28/2022    HGB 14.8 01/28/2022    HCT 44.2 01/28/2022    MCV 91.9 01/28/2022    MCH 30.8 01/28/2022    MCHC 33.5 01/28/2022    RDW 11.7 (L) 01/28/2022    RDWSD 40.0 01/28/2022    MPV 11.4 01/28/2022     01/28/2022    NEUTRORELPCT 55.6 01/28/2022    LYMPHORELPCT 35.9 01/28/2022    MONORELPCT 6.1 01/28/2022    EOSRELPCT 1.5 01/28/2022    BASORELPCT 0.6 01/28/2022    AUTOIGPER 0.3 01/28/2022    NEUTROABS 4.01 01/28/2022    LYMPHSABS 2.59 01/28/2022    MONOSABS 0.44 01/28/2022    EOSABS 0.11 01/28/2022    BASOSABS 0.04 01/28/2022    AUTOIGNUM 0.02 01/28/2022    NRBC 0.0 01/28/2022       TSH  Lab Results   Component Value Date    TSH 2.560 03/11/2022    TSH 7.080 (H) 01/28/2022    TSH 5.330 (H) 10/09/2020       T4  Lab Results   Component Value Date    FREET4 1.43 01/28/2022    FREET4 1.24 10/09/2020       TPO  Lab Results   Component Value Date    THYROIDAB 304 (H) 01/28/2022     EXAMINATION: US THYROID-      INDICATION: elevated TSH, elevated thyroid peroxidase antibodies;  R79.89-Other specified abnormal findings of blood chemistry; E03.8-Other  specified hypothyroidism; E06.3-Autoimmune thyroiditis      COMPARISON: NONE     FINDINGS: Sonographic grayscale and color Doppler evaluation of the  thyroid demonstrates     Right thyroid lobe measures 4.9 x 1.2 x 0.2 cm. The gland is  heterogeneous without dominant suspicious focal nodule.     Left thyroid lobe measures 5.2 x 1.3 x 1.7 cm. There is a solid  hyperechoic 1.1 x 0.8 x 1.1 cm nodule. Normal color Doppler flow.     IMPRESSION:  TI-RADS 3 solid left 11 mm thyroid lobe nodule. Recommend  follow-up in one year to document stability.     This report was finalized on 2/8/2022 3:07 PM by Cullen Godfrey.    Assessment and  "Plan    Diagnoses and all orders for this visit:    1. Hypothyroidism due to Hashimoto's thyroiditis (Primary)  Recently diagnosed, started on levothyroxine in January 2022.  On levothyroxine 50 mcg daily, clinically euthyroid though still complaining of some fatigue.  TPO antibody positive.  No need to recheck or monitor antibody level.  Recheck TFTs today and titrate levothyroxine if needed for TSH in the lower range of normal, target less than 2.5 due to potential future plans for children (not actively trying).  If doses change, recheck labs in 6 weeks.  -     T4, Free  -     TSH    2. Solitary thyroid nodule  I reviewed the ultrasound report and images.  She has a hyperechoic left 11 mm well-circumscribed nodule.  This is consistent with \"white corona\" nodule often seen with Hashimoto's.  No FNA indicated.  Repeat ultrasound in 1 year - next in February 2023. I will update in the office.     3. Diarrhea, unspecified type  Screen for celiac disease.   -     Celiac Comprehensive Panel         Return in about 4 months (around 9/16/2022) for next scheduled follow up. The patient was instructed to contact the clinic with any interval questions or concerns.    Becca Ramesh, DO   Endocrinologist    Please note that portions of this note were completed with a voice recognition program.  "

## 2022-05-17 DIAGNOSIS — E06.3 HYPOTHYROIDISM DUE TO HASHIMOTO'S THYROIDITIS: Primary | ICD-10-CM

## 2022-05-17 DIAGNOSIS — E03.8 HYPOTHYROIDISM DUE TO HASHIMOTO'S THYROIDITIS: Primary | ICD-10-CM

## 2022-05-17 LAB
T4 FREE SERPL-MCNC: 1.57 NG/DL (ref 0.93–1.7)
TSH SERPL DL<=0.05 MIU/L-ACNC: 2.04 UIU/ML (ref 0.27–4.2)

## 2022-05-18 LAB
ENDOMYSIUM IGA SER QL: NEGATIVE
GLIADIN PEPTIDE IGA SER-ACNC: 3 UNITS (ref 0–19)
GLIADIN PEPTIDE IGG SER-ACNC: 2 UNITS (ref 0–19)
IGA SERPL-MCNC: 110 MG/DL (ref 87–352)
TTG IGA SER-ACNC: <2 U/ML (ref 0–3)
TTG IGG SER-ACNC: 5 U/ML (ref 0–5)

## 2022-07-01 DIAGNOSIS — E06.3 HYPOTHYROIDISM DUE TO HASHIMOTO'S THYROIDITIS: ICD-10-CM

## 2022-07-01 DIAGNOSIS — E03.8 HYPOTHYROIDISM DUE TO HASHIMOTO'S THYROIDITIS: ICD-10-CM

## 2022-07-01 RX ORDER — LEVOTHYROXINE SODIUM 0.05 MG/1
TABLET ORAL
Qty: 30 TABLET | Refills: 0 | Status: SHIPPED | OUTPATIENT
Start: 2022-07-01 | End: 2022-08-02 | Stop reason: SDUPTHER

## 2022-08-01 DIAGNOSIS — E06.3 HYPOTHYROIDISM DUE TO HASHIMOTO'S THYROIDITIS: ICD-10-CM

## 2022-08-01 DIAGNOSIS — E03.8 HYPOTHYROIDISM DUE TO HASHIMOTO'S THYROIDITIS: ICD-10-CM

## 2022-08-02 DIAGNOSIS — E03.8 HYPOTHYROIDISM DUE TO HASHIMOTO'S THYROIDITIS: ICD-10-CM

## 2022-08-02 DIAGNOSIS — E06.3 HYPOTHYROIDISM DUE TO HASHIMOTO'S THYROIDITIS: ICD-10-CM

## 2022-08-02 RX ORDER — LEVOTHYROXINE SODIUM 0.05 MG/1
50 TABLET ORAL DAILY
Qty: 30 TABLET | Refills: 0 | OUTPATIENT
Start: 2022-08-02

## 2022-08-02 NOTE — TELEPHONE ENCOUNTER
Rx Refill Note  Requested Prescriptions     Pending Prescriptions Disp Refills   • levothyroxine (SYNTHROID, LEVOTHROID) 50 MCG tablet 30 tablet 0     Sig: Take 1 tablet by mouth Daily.      Last office visit with prescribing clinician: 1/28/2022      Next office visit with prescribing clinician: Visit date not found            Dina Brush MA  08/02/22, 08:23 EDT       Attempted to contact patient no answer left vm       Patient needs appointment      HUB MAY RELAY MESSAGE, SCHEDULE APPOINTMENT, AND DOCUMENT.

## 2022-08-02 NOTE — TELEPHONE ENCOUNTER
PATIENT CALLED IN I RELAYED THE HUB TO READ MESSAGE THE PATIENT STATES THAT SHE WILL CALL HER ENDOCRINOLOGIST SHE THINKS THAT THEY SHOULD FILL  THE MEDICATION

## 2022-08-02 NOTE — TELEPHONE ENCOUNTER
Patient called stated her PCP was filling this until she could be seen here and now her PCP wants us to send in the refill. Please advise.       Last ov 5/16/22  Follow up scheduled 9/19/22

## 2022-08-03 RX ORDER — LEVOTHYROXINE SODIUM 0.05 MG/1
50 TABLET ORAL DAILY
Qty: 30 TABLET | Refills: 1 | Status: SHIPPED | OUTPATIENT
Start: 2022-08-03 | End: 2022-08-17 | Stop reason: SDUPTHER

## 2022-08-16 ENCOUNTER — LAB (OUTPATIENT)
Dept: LAB | Facility: HOSPITAL | Age: 27
End: 2022-08-16

## 2022-08-16 DIAGNOSIS — E06.3 HYPOTHYROIDISM DUE TO HASHIMOTO'S THYROIDITIS: ICD-10-CM

## 2022-08-16 DIAGNOSIS — E03.8 HYPOTHYROIDISM DUE TO HASHIMOTO'S THYROIDITIS: ICD-10-CM

## 2022-08-16 LAB
T4 FREE SERPL-MCNC: 1.32 NG/DL (ref 0.93–1.7)
TSH SERPL DL<=0.05 MIU/L-ACNC: 4.26 UIU/ML (ref 0.27–4.2)

## 2022-08-16 PROCEDURE — 84439 ASSAY OF FREE THYROXINE: CPT

## 2022-08-16 PROCEDURE — 84443 ASSAY THYROID STIM HORMONE: CPT

## 2022-08-16 PROCEDURE — 36415 COLL VENOUS BLD VENIPUNCTURE: CPT

## 2022-08-17 DIAGNOSIS — E03.8 HYPOTHYROIDISM DUE TO HASHIMOTO'S THYROIDITIS: ICD-10-CM

## 2022-08-17 DIAGNOSIS — E06.3 HYPOTHYROIDISM DUE TO HASHIMOTO'S THYROIDITIS: ICD-10-CM

## 2022-08-17 RX ORDER — LEVOTHYROXINE SODIUM 0.07 MG/1
75 TABLET ORAL DAILY
Qty: 90 TABLET | Refills: 1 | Status: SHIPPED | OUTPATIENT
Start: 2022-08-17 | End: 2022-09-19 | Stop reason: SDUPTHER

## 2022-09-19 ENCOUNTER — LAB (OUTPATIENT)
Dept: LAB | Facility: HOSPITAL | Age: 27
End: 2022-09-19

## 2022-09-19 ENCOUNTER — OFFICE VISIT (OUTPATIENT)
Dept: ENDOCRINOLOGY | Facility: CLINIC | Age: 27
End: 2022-09-19

## 2022-09-19 VITALS
BODY MASS INDEX: 23.34 KG/M2 | OXYGEN SATURATION: 99 % | SYSTOLIC BLOOD PRESSURE: 108 MMHG | HEART RATE: 73 BPM | HEIGHT: 62 IN | DIASTOLIC BLOOD PRESSURE: 68 MMHG | WEIGHT: 126.8 LBS

## 2022-09-19 DIAGNOSIS — E04.1 SOLITARY THYROID NODULE: ICD-10-CM

## 2022-09-19 DIAGNOSIS — E03.8 HYPOTHYROIDISM DUE TO HASHIMOTO'S THYROIDITIS: Primary | ICD-10-CM

## 2022-09-19 DIAGNOSIS — E06.3 HYPOTHYROIDISM DUE TO HASHIMOTO'S THYROIDITIS: ICD-10-CM

## 2022-09-19 DIAGNOSIS — E06.3 HYPOTHYROIDISM DUE TO HASHIMOTO'S THYROIDITIS: Primary | ICD-10-CM

## 2022-09-19 DIAGNOSIS — E03.8 HYPOTHYROIDISM DUE TO HASHIMOTO'S THYROIDITIS: ICD-10-CM

## 2022-09-19 LAB
T4 FREE SERPL-MCNC: 1.37 NG/DL (ref 0.93–1.7)
TSH SERPL DL<=0.05 MIU/L-ACNC: 5.48 UIU/ML (ref 0.27–4.2)

## 2022-09-19 PROCEDURE — 84443 ASSAY THYROID STIM HORMONE: CPT | Performed by: INTERNAL MEDICINE

## 2022-09-19 PROCEDURE — 84439 ASSAY OF FREE THYROXINE: CPT | Performed by: INTERNAL MEDICINE

## 2022-09-19 PROCEDURE — 99213 OFFICE O/P EST LOW 20 MIN: CPT | Performed by: INTERNAL MEDICINE

## 2022-09-19 RX ORDER — LEVOTHYROXINE SODIUM 88 UG/1
88 TABLET ORAL DAILY
Qty: 30 TABLET | Refills: 5 | Status: SHIPPED | OUTPATIENT
Start: 2022-09-19 | End: 2023-03-20

## 2022-09-19 NOTE — PROGRESS NOTES
"Chief Complaint   Patient presents with   • Hashimoto's Thyroiditis   • Hypothyroidism        HPI   Alla Waldron is a 27 y.o. female had concerns including Hashimoto's Thyroiditis and Hypothyroidism.      Dose of levothyroxine was increased about a month ago for elevated TSH.   Pt noted fatigue at the time which is now improved.   Is on levothyroxine 75 mcg daily.    The following portions of the patient's history were reviewed and updated as appropriate: allergies, current medications, past family history, past medical history, past social history, past surgical history and problem list.    Review of Systems   Constitutional: Positive for fatigue.   Endocrine: Negative.         /68   Pulse 73   Ht 157.5 cm (62\")   Wt 57.5 kg (126 lb 12.8 oz)   SpO2 99%   BMI 23.19 kg/m²      Physical Exam  Vitals reviewed.   Constitutional:       Appearance: Normal appearance.   Neck:      Thyroid: No thyroid mass or thyromegaly.   Cardiovascular:      Rate and Rhythm: Normal rate.   Pulmonary:      Effort: Pulmonary effort is normal.   Neurological:      General: No focal deficit present.      Mental Status: She is alert. Mental status is at baseline.   Psychiatric:         Mood and Affect: Mood normal.         Behavior: Behavior normal.              LABS AND IMAGING   CMP  Lab Results   Component Value Date    GLUCOSE 78 01/28/2022    BUN 12 01/28/2022    CREATININE 0.92 01/28/2022    EGFRIFNONA 74 01/28/2022    BCR 13.0 01/28/2022    K 4.4 01/28/2022    CO2 23.7 01/28/2022    CALCIUM 9.8 01/28/2022    ALBUMIN 4.50 01/28/2022    AST 17 01/28/2022    ALT 11 01/28/2022        CBC w/DIFF   Lab Results   Component Value Date    WBC 7.21 01/28/2022    RBC 4.81 01/28/2022    HGB 14.8 01/28/2022    HCT 44.2 01/28/2022    MCV 91.9 01/28/2022    MCH 30.8 01/28/2022    MCHC 33.5 01/28/2022    RDW 11.7 (L) 01/28/2022    RDWSD 40.0 01/28/2022    MPV 11.4 01/28/2022     01/28/2022    NEUTRORELPCT 55.6 01/28/2022    " "LYMPHORELPCT 35.9 01/28/2022    MONORELPCT 6.1 01/28/2022    EOSRELPCT 1.5 01/28/2022    BASORELPCT 0.6 01/28/2022    AUTOIGPER 0.3 01/28/2022    NEUTROABS 4.01 01/28/2022    LYMPHSABS 2.59 01/28/2022    MONOSABS 0.44 01/28/2022    EOSABS 0.11 01/28/2022    BASOSABS 0.04 01/28/2022    AUTOIGNUM 0.02 01/28/2022    NRBC 0.0 01/28/2022     TSH  Lab Results   Component Value Date    TSH 4.260 (H) 08/16/2022    TSH 2.040 05/16/2022    TSH 2.560 03/11/2022     T4  Lab Results   Component Value Date    FREET4 1.32 08/16/2022    FREET4 1.57 05/16/2022    FREET4 1.43 01/28/2022     TPO  Lab Results   Component Value Date    THYROIDAB 304 (H) 01/28/2022   EXAMINATION: US THYROID-      INDICATION: elevated TSH, elevated thyroid peroxidase antibodies;  R79.89-Other specified abnormal findings of blood chemistry; E03.8-Other  specified hypothyroidism; E06.3-Autoimmune thyroiditis      COMPARISON: NONE     FINDINGS: Sonographic grayscale and color Doppler evaluation of the  thyroid demonstrates     Right thyroid lobe measures 4.9 x 1.2 x 0.2 cm. The gland is  heterogeneous without dominant suspicious focal nodule.     Left thyroid lobe measures 5.2 x 1.3 x 1.7 cm. There is a solid  hyperechoic 1.1 x 0.8 x 1.1 cm nodule. Normal color Doppler flow.     IMPRESSION:  TI-RADS 3 solid left 11 mm thyroid lobe nodule. Recommend  follow-up in one year to document stability.     This report was finalized on 2/8/2022 3:07 PM by Cullen Godfrey.      Assessment and Plan    Diagnoses and all orders for this visit:    1. Hypothyroidism due to Hashimoto's thyroiditis (Primary)  Clinically euthyroid on levothyroxine 75 mcg daily.  Dose increased a month ago for elevated TSH and symptoms have improved.  Recheck TFTs today and titrate levothyroxine if able for TSH in the lower range of normal.  -     T4, Free  -     TSH    2. Solitary thyroid nodule  She has a hyperechoic left 11 mm well-circumscribed nodule.  This is consistent with \"white " "corona\" nodule often seen with Hashimoto's.  No FNA indicated.  Repeat ultrasound in 1 year - next in February 2023. I will update in the office.          Return in about 5 months (around 2/19/2023) for next scheduled follow up, with thyroid ultrasound ** 30 min appt. The patient was instructed to contact the clinic with any interval questions or concerns.    Becca Ramesh, DO   Endocrinologist    Please note that portions of this note were completed with a voice recognition program.    "

## 2023-02-10 ENCOUNTER — OFFICE VISIT (OUTPATIENT)
Dept: FAMILY MEDICINE CLINIC | Facility: CLINIC | Age: 28
End: 2023-02-10
Payer: COMMERCIAL

## 2023-02-10 ENCOUNTER — LAB (OUTPATIENT)
Dept: LAB | Facility: HOSPITAL | Age: 28
End: 2023-02-10
Payer: COMMERCIAL

## 2023-02-10 VITALS
DIASTOLIC BLOOD PRESSURE: 70 MMHG | HEART RATE: 79 BPM | BODY MASS INDEX: 23.19 KG/M2 | HEIGHT: 62 IN | WEIGHT: 126 LBS | SYSTOLIC BLOOD PRESSURE: 104 MMHG | OXYGEN SATURATION: 99 %

## 2023-02-10 DIAGNOSIS — Z11.1 SCREENING-PULMONARY TB: ICD-10-CM

## 2023-02-10 DIAGNOSIS — E06.3 HYPOTHYROIDISM DUE TO HASHIMOTO'S THYROIDITIS: ICD-10-CM

## 2023-02-10 DIAGNOSIS — Z00.00 ANNUAL PHYSICAL EXAM: ICD-10-CM

## 2023-02-10 DIAGNOSIS — Z00.00 ANNUAL PHYSICAL EXAM: Primary | ICD-10-CM

## 2023-02-10 DIAGNOSIS — E03.8 HYPOTHYROIDISM DUE TO HASHIMOTO'S THYROIDITIS: ICD-10-CM

## 2023-02-10 DIAGNOSIS — E55.9 VITAMIN D DEFICIENCY: ICD-10-CM

## 2023-02-10 LAB
25(OH)D3 SERPL-MCNC: 34.5 NG/ML (ref 30–100)
ALBUMIN SERPL-MCNC: 4.5 G/DL (ref 3.5–5.2)
ALBUMIN/GLOB SERPL: 1.7 G/DL
ALP SERPL-CCNC: 48 U/L (ref 39–117)
ALT SERPL W P-5'-P-CCNC: 13 U/L (ref 1–33)
ANION GAP SERPL CALCULATED.3IONS-SCNC: 10 MMOL/L (ref 5–15)
AST SERPL-CCNC: 15 U/L (ref 1–32)
BASOPHILS # BLD AUTO: 0.03 10*3/MM3 (ref 0–0.2)
BASOPHILS NFR BLD AUTO: 0.5 % (ref 0–1.5)
BILIRUB SERPL-MCNC: 0.8 MG/DL (ref 0–1.2)
BUN SERPL-MCNC: 9 MG/DL (ref 6–20)
BUN/CREAT SERPL: 9.7 (ref 7–25)
CALCIUM SPEC-SCNC: 9.2 MG/DL (ref 8.6–10.5)
CHLORIDE SERPL-SCNC: 107 MMOL/L (ref 98–107)
CHOLEST SERPL-MCNC: 170 MG/DL (ref 0–200)
CO2 SERPL-SCNC: 22 MMOL/L (ref 22–29)
CREAT SERPL-MCNC: 0.93 MG/DL (ref 0.57–1)
DEPRECATED RDW RBC AUTO: 37.9 FL (ref 37–54)
EGFRCR SERPLBLD CKD-EPI 2021: 86.6 ML/MIN/1.73
EOSINOPHIL # BLD AUTO: 0.12 10*3/MM3 (ref 0–0.4)
EOSINOPHIL NFR BLD AUTO: 2.1 % (ref 0.3–6.2)
ERYTHROCYTE [DISTWIDTH] IN BLOOD BY AUTOMATED COUNT: 11.8 % (ref 12.3–15.4)
GLOBULIN UR ELPH-MCNC: 2.6 GM/DL
GLUCOSE SERPL-MCNC: 81 MG/DL (ref 65–99)
HCT VFR BLD AUTO: 44.3 % (ref 34–46.6)
HDLC SERPL-MCNC: 53 MG/DL (ref 40–60)
HGB BLD-MCNC: 15 G/DL (ref 12–15.9)
IMM GRANULOCYTES # BLD AUTO: 0.02 10*3/MM3 (ref 0–0.05)
IMM GRANULOCYTES NFR BLD AUTO: 0.4 % (ref 0–0.5)
LDLC SERPL CALC-MCNC: 106 MG/DL (ref 0–100)
LDLC/HDLC SERPL: 1.99 {RATIO}
LYMPHOCYTES # BLD AUTO: 2.38 10*3/MM3 (ref 0.7–3.1)
LYMPHOCYTES NFR BLD AUTO: 42.4 % (ref 19.6–45.3)
MCH RBC QN AUTO: 30.1 PG (ref 26.6–33)
MCHC RBC AUTO-ENTMCNC: 33.9 G/DL (ref 31.5–35.7)
MCV RBC AUTO: 89 FL (ref 79–97)
MONOCYTES # BLD AUTO: 0.3 10*3/MM3 (ref 0.1–0.9)
MONOCYTES NFR BLD AUTO: 5.3 % (ref 5–12)
NEUTROPHILS NFR BLD AUTO: 2.76 10*3/MM3 (ref 1.7–7)
NEUTROPHILS NFR BLD AUTO: 49.3 % (ref 42.7–76)
NRBC BLD AUTO-RTO: 0 /100 WBC (ref 0–0.2)
PLATELET # BLD AUTO: 248 10*3/MM3 (ref 140–450)
PMV BLD AUTO: 11.5 FL (ref 6–12)
POTASSIUM SERPL-SCNC: 4 MMOL/L (ref 3.5–5.2)
PROT SERPL-MCNC: 7.1 G/DL (ref 6–8.5)
RBC # BLD AUTO: 4.98 10*6/MM3 (ref 3.77–5.28)
SODIUM SERPL-SCNC: 139 MMOL/L (ref 136–145)
TRIGL SERPL-MCNC: 58 MG/DL (ref 0–150)
TSH SERPL DL<=0.05 MIU/L-ACNC: 3.11 UIU/ML (ref 0.27–4.2)
VLDLC SERPL-MCNC: 11 MG/DL (ref 5–40)
WBC NRBC COR # BLD: 5.61 10*3/MM3 (ref 3.4–10.8)

## 2023-02-10 PROCEDURE — 86480 TB TEST CELL IMMUN MEASURE: CPT

## 2023-02-10 PROCEDURE — 82306 VITAMIN D 25 HYDROXY: CPT

## 2023-02-10 PROCEDURE — 80061 LIPID PANEL: CPT

## 2023-02-10 PROCEDURE — 99395 PREV VISIT EST AGE 18-39: CPT | Performed by: NURSE PRACTITIONER

## 2023-02-10 PROCEDURE — 80050 GENERAL HEALTH PANEL: CPT

## 2023-02-10 NOTE — PROGRESS NOTES
Patient Care Team:  Loren Siegel APRN as PCP - General (Nurse Practitioner)  Becca Ramesh DO as Consulting Physician (Endocrinology)     Chief complaint: Patient is in today for a physical     Patient is a 27 y.o. female who presents for her yearly physical exam.     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  Tries to eat healthy diet, stays active.  Hypothyroidism is managed by Dr. Ramesh, levothyroxine dose has been adjusted recently. Patient endorses more fatigue, unclear if because of thyroid or work/school schedule  Review of Systems   Constitutional: Positive for fatigue. Negative for activity change, appetite change, chills, diaphoresis, fever and unexpected weight change.   HENT: Negative for congestion, ear pain, hearing loss and trouble swallowing.    Eyes: Negative for photophobia, redness and visual disturbance.   Respiratory: Negative for cough, shortness of breath and wheezing.    Cardiovascular: Negative for chest pain, palpitations and leg swelling.   Gastrointestinal: Positive for constipation. Negative for abdominal pain, blood in stool and diarrhea.   Endocrine: Positive for cold intolerance. Negative for heat intolerance, polydipsia and polyuria.   Genitourinary: Negative for difficulty urinating, dysuria and menstrual problem.   Musculoskeletal: Negative for arthralgias, back pain, neck pain and neck stiffness.   Skin: Negative for color change, pallor, rash and wound.   Neurological: Positive for syncope (vasovagal syncope). Negative for dizziness, tremors, seizures, light-headedness, numbness and headaches.   Psychiatric/Behavioral: Negative for dysphoric mood, self-injury, sleep disturbance and suicidal ideas. The patient is not nervous/anxious.       History  Past Medical History:   Diagnosis Date   • Depression     for 10 mths in 2014   • Hashimoto's thyroiditis 1/28/22   • Hypothyroidism 10/19/20    Elevated TSH, but  normal T4   • Solitary thyroid nodule 5/16/2022   • Thyroid nodule 2/8/22    11mm nodule left lobe      Past Surgical History:   Procedure Laterality Date   • WISDOM TOOTH EXTRACTION  01/01/2014      Allergies   Allergen Reactions   • Penicillins       Family History   Problem Relation Age of Onset   • Depression Mother    • Hyperlipidemia Mother    • Thyroid disease Mother         hypothyroidism   • Hypertension Mother    • Depression Father    • Hypertension Father    • Thyroid disease Father         hypothyroidism   • Breast cancer Paternal Grandmother    • Ovarian cancer Neg Hx      Social History     Socioeconomic History   • Marital status:    Tobacco Use   • Smoking status: Never   • Smokeless tobacco: Never   Vaping Use   • Vaping Use: Never used   Substance and Sexual Activity   • Alcohol use: Yes     Alcohol/week: 2.0 standard drinks     Types: 2 Glasses of wine per week     Comment: occasionally   • Drug use: No   • Sexual activity: Yes     Partners: Male     Birth control/protection: Condom, OCP        Current Outpatient Medications:   •  Apri 0.15-30 MG-MCG per tablet, , Disp: , Rfl:   •  levothyroxine (SYNTHROID, LEVOTHROID) 88 MCG tablet, Take 1 tablet by mouth Daily., Disp: 30 tablet, Rfl: 5  •  Loratadine 10 MG capsule, Take  by mouth., Disp: , Rfl:   •  Multiple Vitamins-Minerals (MULTIVITAMIN ADULTS PO), Take  by mouth Daily., Disp: , Rfl:     Immunizations   N/A   Prescribed/Refused   Date     Td/Tdap  (Booster Q 10 yrs)   []           Prescribed    []     Refused        []           Flu  (Yearly)   []        Prescribed    []     Refused        []           Pneumonia      []        Prescribed    []     Refused        []                 Hep B     []        Prescribed    []     Refused        []           Shingles  (Age 50 and older)   []        Prescribed    []     Refused        []           Immunization History   Administered Date(s) Administered   • COVID-19 (PFIZER) PURPLE CAP  01/12/2021, 02/04/2021, 12/21/2021   • Flu Vaccine Split Quad 09/25/2020   • FluLaval/Fluzone >6mos 10/12/2021, 09/23/2022, 09/23/2022   • Hepatitis B 1995, 1995, 05/09/1996, 12/08/2017   • Influenza Whole 10/01/2016   • Influenza, Unspecified 10/25/2016, 12/04/2017, 11/20/2018, 10/03/2019, 09/25/2020   • MMR 12/06/1996, 08/25/1999   • PPD Test 09/22/2015, 10/25/2016, 08/10/2020, 10/05/2021, 09/20/2022   • Tdap 06/28/2006, 01/01/2017, 10/09/2020     Health Maintenance   Topic Date Due   • ANNUAL PHYSICAL  Never done   • COVID-19 Vaccine (4 - Booster for Pfizer series) 02/15/2022   • PAP SMEAR  06/27/2025   • TDAP/TD VACCINES (4 - Td or Tdap) 10/09/2030   • HEPATITIS C SCREENING  Completed   • INFLUENZA VACCINE  Completed   • Pneumococcal Vaccine 0-64  Aged Out        Diabetes  [] Yes  [x] No   N/A      Date     Eye Exam     []             []   Complete     []   Recommended Date:  Where:       Foot Exam     []         []   Complete          Obesity Counseling     []       []   Complete     No results found for: HGBA1C, MICROALBUR    Additional Testing      Date     Colorectal Screening       [x]   N/A   []   Complete    []   Ordered     Date:    Where:       Pap      []   N/A   []   Complete   [x]   OB/GYN Date:    Where:       Mammogram        [x]   N/A   []   Complete   []  OB/GYN   []   Ordered Date:    Where:     PSA  (Over age 50)    []   N/A   []   Complete   []   Ordered Date:    Where:     US Aorta  (For male smokers, age 65)     []   N/A   []   Complete   []   Ordered Date:    Where:     CT for Smoker  (Age 55-75, 30 pk yr)    []   N/A   []   Complete   []   Ordered Date:    Where:     Bone Density/DEXA      []   N/A   []   Complete   []   Ordered Date:    Follow-up:     Hep. C        []   N/A   []   Complete   []   Ordered Date:    Where:     Results for orders placed or performed in visit on 09/19/22   T4, Free    Specimen: Blood   Result Value Ref Range    Free T4 1.37 0.93 - 1.70 ng/dL   TSH  "   Specimen: Blood   Result Value Ref Range    TSH 5.480 (H) 0.270 - 4.200 uIU/mL            /70   Pulse 79   Ht 157.5 cm (62.01\")   Wt 57.2 kg (126 lb)   SpO2 99%   BMI 23.04 kg/m²       Physical Exam  Vitals and nursing note reviewed.   Constitutional:       General: She is not in acute distress.     Appearance: Normal appearance. She is well-developed. She is not diaphoretic.   HENT:      Head: Normocephalic and atraumatic.      Right Ear: Tympanic membrane and external ear normal.      Left Ear: Tympanic membrane and external ear normal.      Nose: Nose normal.   Eyes:      General: No scleral icterus.        Right eye: No discharge.         Left eye: No discharge.      Conjunctiva/sclera: Conjunctivae normal.      Pupils: Pupils are equal, round, and reactive to light.   Neck:      Thyroid: No thyromegaly.      Vascular: No carotid bruit or JVD.      Trachea: No tracheal deviation.   Cardiovascular:      Rate and Rhythm: Normal rate and regular rhythm.      Heart sounds: Normal heart sounds. No murmur heard.    No friction rub. No gallop.   Pulmonary:      Effort: Pulmonary effort is normal. No respiratory distress.      Breath sounds: Normal breath sounds. No stridor. No wheezing or rales.   Chest:      Chest wall: No tenderness.   Abdominal:      General: Bowel sounds are normal. There is no distension.      Palpations: Abdomen is soft. There is no mass.      Tenderness: There is no abdominal tenderness. There is no guarding or rebound.      Hernia: No hernia is present.   Musculoskeletal:         General: No tenderness or deformity.      Cervical back: Normal range of motion and neck supple.      Right lower leg: No edema.      Left lower leg: No edema.   Lymphadenopathy:      Cervical: No cervical adenopathy.   Skin:     General: Skin is warm and dry.      Coloration: Skin is not pale.      Findings: No erythema or rash.   Neurological:      Mental Status: She is alert and oriented to person, " place, and time.      Cranial Nerves: No cranial nerve deficit.      Motor: No abnormal muscle tone.      Coordination: Coordination normal.      Deep Tendon Reflexes: Reflexes are normal and symmetric. Reflexes normal.   Psychiatric:         Behavior: Behavior normal.         Thought Content: Thought content normal.         Judgment: Judgment normal.             Counseling provided on diet and nutrition.    Diagnoses and all orders for this visit:    Annual physical exam  -     CBC Auto Differential; Future  -     Comprehensive Metabolic Panel; Future  -     Lipid Panel; Future  -     TSH Rfx On Abnormal To Free T4; Future    Vitamin D deficiency  -     Vitamin D,25-Hydroxy; Future    Screening-pulmonary TB  -     QuantiFERON-TB Gold Plus; Future    Hypothyroidism due to Hashimoto's thyroiditis       Screening labs ordered to evaluate chronic conditions. I will contact patient regarding test results and provide instructions regarding any necessary changes in plan of care.    Nutrition and activity goals reviewed including: mainly water to drink, limit white flour/processed sugar, and processed foods, choose fresh fruits, vegetables, fish, lean meats,high fiber carbs, exercise  working toward 150 mins cardio per week, weight training 2x/week.  Provided information in [employer] on preventative care for patient age group.  Patient was encouraged to keep me informed of any acute changes, lack of improvement, or any new concerning symptoms.      Loren Christal, APRN   2/10/2023   08:15 EST          Please note that portions of this document were completed with a voice recognition program.     At [facility], we believe that sharing information builds trust and better relationships. You are receiving this note because you are receiving care at [facility] or have recently visited. It is possible you will see health information before a provider has talked with you about it. This kind of information can be easy to  misunderstand. To help you fully understand what it means for your health, we urge you to discuss this note with your provider.  Answers for HPI/ROS submitted by the patient on 2/6/2023  Please describe your symptoms.: Fatigue- would like a CBC and CMP, Also need a TB Gold test ordered  Have you had these symptoms before?: Yes  How long have you been having these symptoms?: Greater than 2 weeks  Please list any medications you are currently taking for this condition.: Levothyroxine  Please describe any probable cause for these symptoms. : Probably my hypothyroidism, but would like other labs checked  What is the primary reason for your visit?: Other

## 2023-02-12 LAB
GAMMA INTERFERON BACKGROUND BLD IA-ACNC: 0.03 IU/ML
M TB IFN-G BLD-IMP: NEGATIVE
M TB IFN-G CD4+ T-CELLS BLD-ACNC: 0.05 IU/ML
M TBIFN-G CD4+ CD8+T-CELLS BLD-ACNC: 0.03 IU/ML
MITOGEN IGNF BLD-ACNC: >10 IU/ML
QUANTIFERON INCUBATION: NORMAL
SERVICE CMNT-IMP: NORMAL

## 2023-03-18 DIAGNOSIS — E03.8 HYPOTHYROIDISM DUE TO HASHIMOTO'S THYROIDITIS: ICD-10-CM

## 2023-03-18 DIAGNOSIS — E06.3 HYPOTHYROIDISM DUE TO HASHIMOTO'S THYROIDITIS: ICD-10-CM

## 2023-03-20 RX ORDER — LEVOTHYROXINE SODIUM 88 UG/1
TABLET ORAL
Qty: 30 TABLET | Refills: 3 | Status: SHIPPED | OUTPATIENT
Start: 2023-03-20

## 2023-05-24 ENCOUNTER — OFFICE VISIT (OUTPATIENT)
Dept: ENDOCRINOLOGY | Facility: CLINIC | Age: 28
End: 2023-05-24
Payer: COMMERCIAL

## 2023-05-24 VITALS
BODY MASS INDEX: 23.41 KG/M2 | OXYGEN SATURATION: 100 % | HEIGHT: 62 IN | WEIGHT: 127.2 LBS | TEMPERATURE: 98 F | DIASTOLIC BLOOD PRESSURE: 72 MMHG | HEART RATE: 71 BPM | RESPIRATION RATE: 15 BRPM | SYSTOLIC BLOOD PRESSURE: 114 MMHG

## 2023-05-24 DIAGNOSIS — E04.1 SOLITARY THYROID NODULE: ICD-10-CM

## 2023-05-24 DIAGNOSIS — E06.3 HYPOTHYROIDISM DUE TO HASHIMOTO'S THYROIDITIS: Primary | ICD-10-CM

## 2023-05-24 DIAGNOSIS — E03.8 HYPOTHYROIDISM DUE TO HASHIMOTO'S THYROIDITIS: Primary | ICD-10-CM

## 2023-05-24 PROCEDURE — 84439 ASSAY OF FREE THYROXINE: CPT | Performed by: INTERNAL MEDICINE

## 2023-05-24 PROCEDURE — 84443 ASSAY THYROID STIM HORMONE: CPT | Performed by: INTERNAL MEDICINE

## 2023-05-24 NOTE — PROGRESS NOTES
"Chief Complaint   Patient presents with   • Hypothyroidism     Hypothyroidism due to Hashimoto's thyroiditis        HPI   Alla Waldron is a 27 y.o. female had concerns including Hypothyroidism (Hypothyroidism due to Hashimoto's thyroiditis).      Fatigue is improved, not resolved.  Noted improvement after last thyroid dose increase.  She is currently on 88 mcg daily.  Not planning for kids currently.  Thinks maybe within a year.    The following portions of the patient's history were reviewed and updated as appropriate: allergies, current medications, past family history, past medical history, past social history, past surgical history and problem list.    Review of Systems   Constitutional: Positive for fatigue.   Endocrine: Negative.         /72 (BP Location: Left arm, Patient Position: Sitting, Cuff Size: Adult)   Pulse 71   Temp 98 °F (36.7 °C) (Infrared)   Resp 15   Ht 157.5 cm (62.01\")   Wt 57.7 kg (127 lb 3.2 oz)   SpO2 100%   BMI 23.26 kg/m²      Physical Exam  Vitals reviewed.   Constitutional:       Appearance: Normal appearance.   Cardiovascular:      Rate and Rhythm: Normal rate.   Pulmonary:      Effort: Pulmonary effort is normal.   Neurological:      General: No focal deficit present.      Mental Status: She is alert. Mental status is at baseline.   Psychiatric:         Mood and Affect: Mood normal.         Behavior: Behavior normal.              LABS AND IMAGING   CMP  Lab Results   Component Value Date    GLUCOSE 81 02/10/2023    BUN 9 02/10/2023    CREATININE 0.93 02/10/2023    EGFRIFNONA 74 01/28/2022    BCR 9.7 02/10/2023    K 4.0 02/10/2023    CO2 22.0 02/10/2023    CALCIUM 9.2 02/10/2023    ALBUMIN 4.5 02/10/2023    AST 15 02/10/2023    ALT 13 02/10/2023        CBC w/DIFF   Lab Results   Component Value Date    WBC 5.61 02/10/2023    RBC 4.98 02/10/2023    HGB 15.0 02/10/2023    HCT 44.3 02/10/2023    MCV 89.0 02/10/2023    MCH 30.1 02/10/2023    MCHC 33.9 02/10/2023    RDW " 11.8 (L) 02/10/2023    RDWSD 37.9 02/10/2023    MPV 11.5 02/10/2023     02/10/2023    NEUTRORELPCT 49.3 02/10/2023    LYMPHORELPCT 42.4 02/10/2023    MONORELPCT 5.3 02/10/2023    EOSRELPCT 2.1 02/10/2023    BASORELPCT 0.5 02/10/2023    AUTOIGPER 0.4 02/10/2023    NEUTROABS 2.76 02/10/2023    LYMPHSABS 2.38 02/10/2023    MONOSABS 0.30 02/10/2023    EOSABS 0.12 02/10/2023    BASOSABS 0.03 02/10/2023    AUTOIGNUM 0.02 02/10/2023    NRBC 0.0 02/10/2023     TSH  Lab Results   Component Value Date    TSH 3.110 02/10/2023    TSH 5.480 (H) 09/19/2022    TSH 4.260 (H) 08/16/2022     T4  Lab Results   Component Value Date    FREET4 1.37 09/19/2022    FREET4 1.32 08/16/2022    FREET4 1.57 05/16/2022     TPO  Lab Results   Component Value Date    THYROIDAB 304 (H) 01/28/2022     EXAMINATION: US THYROID-      INDICATION: elevated TSH, elevated thyroid peroxidase antibodies;  R79.89-Other specified abnormal findings of blood chemistry; E03.8-Other  specified hypothyroidism; E06.3-Autoimmune thyroiditis      COMPARISON: NONE     FINDINGS: Sonographic grayscale and color Doppler evaluation of the  thyroid demonstrates     Right thyroid lobe measures 4.9 x 1.2 x 0.2 cm. The gland is  heterogeneous without dominant suspicious focal nodule.     Left thyroid lobe measures 5.2 x 1.3 x 1.7 cm. There is a solid  hyperechoic 1.1 x 0.8 x 1.1 cm nodule. Normal color Doppler flow.     IMPRESSION:  TI-RADS 3 solid left 11 mm thyroid lobe nodule. Recommend  follow-up in one year to document stability.     This report was finalized on 2/8/2022 3:07 PM by Cullen Godfrey.           Thyroid Ultrasound 05/24/23    Indication: Thyroid nodule  Comparison Imaging: Ultrasound 2022  Clinical History:  Hypothyroidism, solitary thyroid nodule    Real time high resolution imaging of the thyroid gland was performed in transverse and longitudinal planes. Previous imaging reports were reviewed if available and compared to the current to assess  stability.     Lobes:  The right lobe measured 5.0 cm L x 1.6 cm AP x 1.9 cm in TV dimension.    The isthmus measured 0.2 cm in thickness.    The left thyroid lobe measured 5.1 cm L x 1.6 cm AP x 1.8 cm in TV dimension.    Thyroid gland is heterogeneous and contains a single left lobe nodule.    Nodule 1   located in the left mid lobe and measures 0.9 x 0.8 x 0.9 cm (L X AP X TV).  This nodule is solid, heterogeneous, hyperechoic with well-defined margins, and Grade I vascularity on Color Flow Doppler.  It has no artifacts.  This nodule has decreased slightly in size from last year when it measured 1.1 cm.      No pathologic lymph nodes were seen.    Impression:  Diffusely heterogeneous thyroid consistent with autoimmune thyroid disease. No distinct right lobe nodules.  Left lobe solid nodule measuring 0.9 cm. Slightly decreased in size from prior ultrasound when it measured 1.1 cm in 2022.    Recommendation:   Repeat ultrasound in 1 year.  If stable, decrease frequency of monitoring/discontinue.      Assessment and Plan    Diagnoses and all orders for this visit:    1. Hypothyroidism due to Hashimoto's thyroiditis (Primary)  Clinically euthyroid on levothyroxine 88 mcg daily.  Fatigue improved but not fully back to baseline.  Recheck TFTs today and titrate levothyroxine if able for TSH in the mid to lower range of normal.  She has not family-planning at this time but thinks may be within a year.  Reminded that she would need dose increases on levothyroxine, TSH target less than 2.5 prior to pregnancy.  -     T4, Free  -     TSH    2. Solitary thyroid nodule  See detailed ultrasound report as above.  Diffusely heterogeneous thyroid consistent with autoimmune thyroid disease. No distinct right lobe nodules.  Left lobe solid nodule measuring 0.9 cm. Slightly decreased in size from prior ultrasound when it measured 1.1 cm in 2022.  Repeat ultrasound in 1 year.  If stable, decrease frequency of  monitoring/discontinue.  -     US Thyroid         Return in about 6 months (around 11/24/2023) for next scheduled follow up. The patient was instructed to contact the clinic with any interval questions or concerns.    Becca Ramesh, DO   Endocrinologist    Please note that portions of this note were completed with a voice recognition program.

## 2023-05-25 DIAGNOSIS — E06.3 HYPOTHYROIDISM DUE TO HASHIMOTO'S THYROIDITIS: ICD-10-CM

## 2023-05-25 DIAGNOSIS — E03.8 HYPOTHYROIDISM DUE TO HASHIMOTO'S THYROIDITIS: ICD-10-CM

## 2023-05-25 LAB
T4 FREE SERPL-MCNC: 1.4 NG/DL (ref 0.93–1.7)
TSH SERPL DL<=0.05 MIU/L-ACNC: 2.72 UIU/ML (ref 0.27–4.2)

## 2023-05-25 RX ORDER — LEVOTHYROXINE SODIUM 0.1 MG/1
100 TABLET ORAL DAILY
Qty: 30 TABLET | Refills: 5 | Status: SHIPPED | OUTPATIENT
Start: 2023-05-25

## 2023-11-23 DIAGNOSIS — E06.3 HYPOTHYROIDISM DUE TO HASHIMOTO'S THYROIDITIS: ICD-10-CM

## 2023-11-23 DIAGNOSIS — E03.8 HYPOTHYROIDISM DUE TO HASHIMOTO'S THYROIDITIS: ICD-10-CM

## 2023-11-27 RX ORDER — LEVOTHYROXINE SODIUM 0.1 MG/1
100 TABLET ORAL DAILY
Qty: 30 TABLET | Refills: 0 | Status: SHIPPED | OUTPATIENT
Start: 2023-11-27 | End: 2023-11-29 | Stop reason: SDUPTHER

## 2023-11-27 NOTE — TELEPHONE ENCOUNTER
Rx Refill Note  Requested Prescriptions     Pending Prescriptions Disp Refills    levothyroxine (SYNTHROID, LEVOTHROID) 100 MCG tablet [Pharmacy Med Name: LEVOTHYROXINE 100 MCG TABLET] 30 tablet 5     Sig: TAKE 1 TABLET BY MOUTH DAILY      Last office visit with prescribing clinician: 5/24/2023     Next office visit with prescribing clinician: 11/28/2023       Homero Balderrama MA  11/27/23, 09:14 EST

## 2023-11-28 ENCOUNTER — OFFICE VISIT (OUTPATIENT)
Dept: ENDOCRINOLOGY | Facility: CLINIC | Age: 28
End: 2023-11-28
Payer: COMMERCIAL

## 2023-11-28 VITALS
OXYGEN SATURATION: 99 % | SYSTOLIC BLOOD PRESSURE: 112 MMHG | BODY MASS INDEX: 23.55 KG/M2 | HEIGHT: 62 IN | WEIGHT: 128 LBS | DIASTOLIC BLOOD PRESSURE: 68 MMHG | HEART RATE: 86 BPM

## 2023-11-28 DIAGNOSIS — E06.3 HYPOTHYROIDISM DUE TO HASHIMOTO'S THYROIDITIS: Primary | ICD-10-CM

## 2023-11-28 DIAGNOSIS — E03.8 HYPOTHYROIDISM DUE TO HASHIMOTO'S THYROIDITIS: Primary | ICD-10-CM

## 2023-11-28 DIAGNOSIS — E04.1 SOLITARY THYROID NODULE: ICD-10-CM

## 2023-11-28 LAB
T4 FREE SERPL-MCNC: 1.71 NG/DL (ref 0.93–1.7)
TSH SERPL DL<=0.05 MIU/L-ACNC: 1.9 UIU/ML (ref 0.27–4.2)

## 2023-11-28 PROCEDURE — 99213 OFFICE O/P EST LOW 20 MIN: CPT | Performed by: INTERNAL MEDICINE

## 2023-11-28 PROCEDURE — 84443 ASSAY THYROID STIM HORMONE: CPT | Performed by: INTERNAL MEDICINE

## 2023-11-28 PROCEDURE — 84439 ASSAY OF FREE THYROXINE: CPT | Performed by: INTERNAL MEDICINE

## 2023-11-28 NOTE — PROGRESS NOTES
"Chief Complaint   Patient presents with    Hashimoto's Thyroiditis        HPI   Alla Waldron is a 28 y.o. female had concerns including Hashimoto's Thyroiditis.      Energy levels improved. Having more migraines. Hasn't identified a trigger. Wonders if thyroid related.   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      The following portions of the patient's history were reviewed and updated as appropriate: allergies, current medications, past family history, past medical history, past social history, past surgical history, and problem list.    Review of Systems   Neurological:  Positive for headache.        /68   Pulse 86   Ht 157.5 cm (62\")   Wt 58.1 kg (128 lb)   SpO2 99%   BMI 23.41 kg/m²      Physical Exam  Vitals reviewed.   Constitutional:       Appearance: Normal appearance.   Cardiovascular:      Rate and Rhythm: Normal rate.   Pulmonary:      Effort: Pulmonary effort is normal.   Neurological:      General: No focal deficit present.      Mental Status: She is alert. Mental status is at baseline.   Psychiatric:         Mood and Affect: Mood normal.         Behavior: Behavior normal.              LABS AND IMAGING   CMP  Lab Results   Component Value Date    GLUCOSE 81 02/10/2023    BUN 9 02/10/2023    CREATININE 0.93 02/10/2023    EGFRIFNONA 74 01/28/2022    BCR 9.7 02/10/2023    K 4.0 02/10/2023    CO2 22.0 02/10/2023    CALCIUM 9.2 02/10/2023    ALBUMIN 4.5 02/10/2023    AST 15 02/10/2023    ALT 13 02/10/2023        CBC w/DIFF   Lab Results   Component Value Date    WBC 5.61 02/10/2023    RBC 4.98 02/10/2023    HGB 15.0 02/10/2023    HCT 44.3 02/10/2023    MCV 89.0 02/10/2023    MCH 30.1 02/10/2023    MCHC 33.9 02/10/2023    RDW 11.8 (L) 02/10/2023    RDWSD 37.9 02/10/2023    MPV 11.5 02/10/2023     02/10/2023    NEUTRORELPCT 49.3 02/10/2023    LYMPHORELPCT 42.4 02/10/2023    MONORELPCT 5.3 02/10/2023    EOSRELPCT 2.1 02/10/2023    BASORELPCT 0.5 02/10/2023    " AUTOIGPER 0.4 02/10/2023    NEUTROABS 2.76 02/10/2023    LYMPHSABS 2.38 02/10/2023    MONOSABS 0.30 02/10/2023    EOSABS 0.12 02/10/2023    BASOSABS 0.03 02/10/2023    AUTOIGNUM 0.02 02/10/2023    NRBC 0.0 02/10/2023     TSH  Lab Results   Component Value Date    TSH 2.720 05/24/2023    TSH 3.110 02/10/2023    TSH 5.480 (H) 09/19/2022     T4  Lab Results   Component Value Date    FREET4 1.40 05/24/2023    FREET4 1.37 09/19/2022    FREET4 1.32 08/16/2022     TPO  Lab Results   Component Value Date    THYROIDAB 304 (H) 01/28/2022     EXAMINATION: US THYROID-      INDICATION: elevated TSH, elevated thyroid peroxidase antibodies;  R79.89-Other specified abnormal findings of blood chemistry; E03.8-Other  specified hypothyroidism; E06.3-Autoimmune thyroiditis      COMPARISON: NONE     FINDINGS: Sonographic grayscale and color Doppler evaluation of the  thyroid demonstrates     Right thyroid lobe measures 4.9 x 1.2 x 0.2 cm. The gland is  heterogeneous without dominant suspicious focal nodule.     Left thyroid lobe measures 5.2 x 1.3 x 1.7 cm. There is a solid  hyperechoic 1.1 x 0.8 x 1.1 cm nodule. Normal color Doppler flow.     IMPRESSION:  TI-RADS 3 solid left 11 mm thyroid lobe nodule. Recommend  follow-up in one year to document stability.     This report was finalized on 2/8/2022 3:07 PM by Cullen Godfrey.      Thyroid Ultrasound 05/24/23     Indication: Thyroid nodule  Comparison Imaging: Ultrasound 2022  Clinical History:  Hypothyroidism, solitary thyroid nodule     Real time high resolution imaging of the thyroid gland was performed in transverse and longitudinal planes. Previous imaging reports were reviewed if available and compared to the current to assess stability.      Lobes:  The right lobe measured 5.0 cm L x 1.6 cm AP x 1.9 cm in TV dimension.    The isthmus measured 0.2 cm in thickness.    The left thyroid lobe measured 5.1 cm L x 1.6 cm AP x 1.8 cm in TV dimension.     Thyroid gland is heterogeneous  and contains a single left lobe nodule.     Nodule 1   located in the left mid lobe and measures 0.9 x 0.8 x 0.9 cm (L X AP X TV).  This nodule is solid, heterogeneous, hyperechoic with well-defined margins, and Grade I vascularity on Color Flow Doppler.  It has no artifacts.  This nodule has decreased slightly in size from last year when it measured 1.1 cm.        No pathologic lymph nodes were seen.     Impression:  Diffusely heterogeneous thyroid consistent with autoimmune thyroid disease. No distinct right lobe nodules.  Left lobe solid nodule measuring 0.9 cm. Slightly decreased in size from prior ultrasound when it measured 1.1 cm in 2022.     Recommendation:   Repeat ultrasound in 1 year.  If stable, decrease frequency of monitoring/discontinue.       Assessment and Plan    Diagnoses and all orders for this visit:    1. Hypothyroidism due to Hashimoto's thyroiditis (Primary)  Clinically euthyroid on levothyroxine 100 mcg daily.  Dose increased last May 2023 for TSH 2.7 and still with fatigue.  Symptoms are improved.  Family-planning within the next year or 2.  Check TFTs today and titrate if able for TSH in the lower range of normal.  If she has a positive pregnancy test prior to follow up, increase dose of levothyroxine by 2 tabs per week.  Call the office of first positive pregnancy test and will monitor TFTs before frequently during pregnancy.  -     T4, Free  -     TSH    2. Solitary thyroid nodule  Ultrasound updated May 2023.  She has a 9 mm left lobe nodule that had decreased in size from prior ultrasound in 2022.  Repeat ultrasound at follow-up visit.  If stable, consider decrease/DC monitoring.       Return in about 6 months (around 5/28/2024) for next scheduled follow up, with thyroid ultrasound ** 30 min appt. The patient was instructed to contact the clinic with any interval questions or concerns.    Becca Ramesh, DO   Endocrinologist    Please note that portions of this note were completed  with a voice recognition program.

## 2023-11-29 DIAGNOSIS — E03.8 HYPOTHYROIDISM DUE TO HASHIMOTO'S THYROIDITIS: ICD-10-CM

## 2023-11-29 DIAGNOSIS — E06.3 HYPOTHYROIDISM DUE TO HASHIMOTO'S THYROIDITIS: ICD-10-CM

## 2023-11-29 RX ORDER — LEVOTHYROXINE SODIUM 0.1 MG/1
100 TABLET ORAL DAILY
Qty: 90 TABLET | Refills: 3 | Status: SHIPPED | OUTPATIENT
Start: 2023-11-29

## 2023-12-28 ENCOUNTER — OFFICE VISIT (OUTPATIENT)
Dept: FAMILY MEDICINE CLINIC | Facility: CLINIC | Age: 28
End: 2023-12-28
Payer: COMMERCIAL

## 2023-12-28 VITALS
OXYGEN SATURATION: 100 % | WEIGHT: 126.6 LBS | BODY MASS INDEX: 23.3 KG/M2 | HEART RATE: 76 BPM | DIASTOLIC BLOOD PRESSURE: 64 MMHG | SYSTOLIC BLOOD PRESSURE: 116 MMHG | HEIGHT: 62 IN

## 2023-12-28 DIAGNOSIS — G43.109 MIGRAINE WITH AURA AND WITHOUT STATUS MIGRAINOSUS, NOT INTRACTABLE: Primary | ICD-10-CM

## 2023-12-28 PROCEDURE — 99213 OFFICE O/P EST LOW 20 MIN: CPT | Performed by: NURSE PRACTITIONER

## 2023-12-28 RX ORDER — SUMATRIPTAN 25 MG/1
TABLET, FILM COATED ORAL
Qty: 10 TABLET | Refills: 1 | Status: SHIPPED | OUTPATIENT
Start: 2023-12-28

## 2023-12-28 NOTE — PROGRESS NOTES
"Subjective   Alla Waldron is a 28 y.o. female.   Chief Complaint   Patient presents with    Migraine     Wants meds for it        Migraine     Has had migraines for 5 years, off and on. These have gotten worse and has had to leave work at least once. 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  The following portions of the patient's history were reviewed and updated as appropriate: allergies, current medications, past family history, past medical history, past social history, past surgical history, and problem list.    Review of Systems   Eyes:  Positive for photophobia (with migraine) and visual disturbance (blurry at onset of migraine).   Gastrointestinal:  Positive for nausea (with migraine only) and vomiting (occ with migraine).   Neurological:  Positive for dizziness (occ with migraine) and headaches (once a month). Negative for seizures and numbness.       Objective   Physical Exam  Vitals reviewed.   Constitutional:       General: She is not in acute distress.     Appearance: Normal appearance. She is not ill-appearing, toxic-appearing or diaphoretic.   Eyes:      General: No scleral icterus.     Extraocular Movements: Extraocular movements intact.      Pupils: Pupils are equal, round, and reactive to light.   Neurological:      Mental Status: She is alert and oriented to person, place, and time.      Cranial Nerves: No cranial nerve deficit.      Motor: No weakness.      Gait: Gait normal.      Deep Tendon Reflexes: Reflexes normal.   Psychiatric:         Behavior: Behavior normal.         Thought Content: Thought content normal.         Judgment: Judgment normal.       /64   Pulse 76   Ht 157.5 cm (62.01\")   Wt 57.4 kg (126 lb 9.6 oz)   SpO2 100%   BMI 23.15 kg/m²     Assessment & Plan   Problems Addressed this Visit    None  Visit Diagnoses       Migraine with aura and without status migrainosus, not intractable    -  Primary    " Relevant Medications    SUMAtriptan (Imitrex) 25 MG tablet          Diagnoses         Codes Comments    Migraine with aura and without status migrainosus, not intractable    -  Primary ICD-10-CM: G43.109  ICD-9-CM: 346.00           Will try sumatriptan for migraines.  Consider neurology referral if no improvement.  Patient was encouraged to keep me informed of any acute changes, lack of improvement, or any new concerning symptoms.           Answers submitted by the patient for this visit:  Other (Submitted on 12/28/2023)  Please describe your symptoms.: Headaches with associated photophobia, nausea, and sometimes vomiting.  Have you had these symptoms before?: Yes  How long have you been having these symptoms?: Greater than 2 weeks  Please list any medications you are currently taking for this condition.: Acetaminophen/aspirin/caffeine  Primary Reason for Visit (Submitted on 12/28/2023)  What is the primary reason for your visit?: Other

## 2024-02-19 DIAGNOSIS — E06.3 HYPOTHYROIDISM DUE TO HASHIMOTO'S THYROIDITIS: ICD-10-CM

## 2024-02-19 DIAGNOSIS — E03.8 HYPOTHYROIDISM DUE TO HASHIMOTO'S THYROIDITIS: ICD-10-CM

## 2024-02-19 RX ORDER — LEVOTHYROXINE SODIUM 0.1 MG/1
100 TABLET ORAL DAILY
Qty: 90 TABLET | Refills: 1 | Status: SHIPPED | OUTPATIENT
Start: 2024-02-19

## 2024-02-19 NOTE — TELEPHONE ENCOUNTER
Rx Refill Note  Requested Prescriptions     Pending Prescriptions Disp Refills    levothyroxine (SYNTHROID, LEVOTHROID) 100 MCG tablet 90 tablet 3     Sig: Take 1 tablet by mouth Daily.        Last office visit with prescribing clinician: 11/28/2023      Next office visit with prescribing clinician: 5/30/2024       Lianet Ignacio (Jodi)  02/19/24, 15:42 EST

## 2024-03-15 ENCOUNTER — OFFICE VISIT (OUTPATIENT)
Dept: FAMILY MEDICINE CLINIC | Facility: CLINIC | Age: 29
End: 2024-03-15
Payer: COMMERCIAL

## 2024-03-15 VITALS
HEART RATE: 70 BPM | BODY MASS INDEX: 23.26 KG/M2 | WEIGHT: 126.4 LBS | DIASTOLIC BLOOD PRESSURE: 78 MMHG | SYSTOLIC BLOOD PRESSURE: 114 MMHG | HEIGHT: 62 IN | OXYGEN SATURATION: 98 %

## 2024-03-15 DIAGNOSIS — Z00.00 ANNUAL PHYSICAL EXAM: Primary | ICD-10-CM

## 2024-03-15 DIAGNOSIS — E06.3 HYPOTHYROIDISM DUE TO HASHIMOTO'S THYROIDITIS: ICD-10-CM

## 2024-03-15 DIAGNOSIS — E03.8 HYPOTHYROIDISM DUE TO HASHIMOTO'S THYROIDITIS: ICD-10-CM

## 2024-03-15 DIAGNOSIS — E55.9 VITAMIN D DEFICIENCY: ICD-10-CM

## 2024-03-15 PROCEDURE — 99395 PREV VISIT EST AGE 18-39: CPT | Performed by: NURSE PRACTITIONER

## 2024-03-15 NOTE — PROGRESS NOTES
Patient Care Team:  Loren Siegel APRN as PCP - General (Nurse Practitioner)  Becca Ramesh DO as Consulting Physician (Endocrinology)     Chief complaint: Patient is in today for a physical     Patient is a 28 y.o. female who presents for her yearly physical exam.   Chief Complaint   Patient presents with    Annual Exam       HPI   Patient is here for physical. Her migraines are improved since cutting out gluten. She exercises, walks daily, goes to gym sometimes  Eating healthy, no red meat, eats salads, fish.  Followed by endo,   Review of Systems   Constitutional:  Negative for activity change, appetite change, chills, diaphoresis, fatigue, fever and unexpected weight change.   HENT:  Negative for congestion, ear pain, hearing loss and trouble swallowing.    Eyes:  Negative for photophobia, redness and visual disturbance.   Respiratory:  Negative for cough, shortness of breath and wheezing.    Cardiovascular:  Negative for chest pain, palpitations and leg swelling.   Gastrointestinal:  Negative for abdominal pain, blood in stool, constipation (improved) and diarrhea.   Endocrine: Negative for cold intolerance, heat intolerance, polydipsia and polyuria.   Genitourinary:  Negative for difficulty urinating, dysuria and menstrual problem.   Musculoskeletal:  Negative for arthralgias, back pain, neck pain and neck stiffness.   Skin:  Negative for color change, pallor, rash and wound.   Neurological:  Positive for syncope (vasovagal syncope) and headaches (occ). Negative for dizziness, tremors, seizures, light-headedness and numbness.   Psychiatric/Behavioral:  Negative for dysphoric mood, self-injury, sleep disturbance and suicidal ideas. The patient is not nervous/anxious.       History  Past Medical History:   Diagnosis Date    Depression     for 10 mths in 2014    Hashimoto's thyroiditis 1/28/22    Hypothyroidism 10/19/20    Elevated TSH, but normal T4    Migraines 2018    Solitary thyroid nodule  05/16/2022    Thyroid nodule 2/8/22    11mm nodule left lobe      Past Surgical History:   Procedure Laterality Date    WISDOM TOOTH EXTRACTION  01/01/2014      Allergies   Allergen Reactions    Penicillins       Family History   Problem Relation Age of Onset    Depression Mother     Hyperlipidemia Mother     Thyroid disease Mother         hypothyroidism    Hypertension Mother     Depression Father     Hypertension Father     Thyroid disease Father         hypothyroidism    Breast cancer Paternal Grandmother     Ovarian cancer Neg Hx      Social History     Socioeconomic History    Marital status:    Tobacco Use    Smoking status: Never     Passive exposure: Never    Smokeless tobacco: Never   Vaping Use    Vaping status: Never Used   Substance and Sexual Activity    Alcohol use: Yes     Alcohol/week: 1.0 standard drink of alcohol     Types: 1 Glasses of wine per week     Comment: occasionally    Drug use: No    Sexual activity: Yes     Partners: Male     Birth control/protection: Condom, OCP        Current Outpatient Medications:     Apri 0.15-30 MG-MCG per tablet, , Disp: , Rfl:     levothyroxine (SYNTHROID, LEVOTHROID) 100 MCG tablet, Take 1 tablet by mouth Daily., Disp: 90 tablet, Rfl: 1    Loratadine 10 MG capsule, Take  by mouth., Disp: , Rfl:     SUMAtriptan (Imitrex) 25 MG tablet, Take one tablet at onset of headache. May repeat dose one time in 2 hours if headache not relieved., Disp: 10 tablet, Rfl: 1    Immunizations   N/A   Prescribed/Refused   Date     Td/Tdap  (Booster Q 10 yrs)   []           Prescribed    []     Refused        []           Flu  (Yearly)   []        Prescribed    []     Refused        []           Pneumonia      []        Prescribed    []     Refused        []                 Hep B     []        Prescribed    []     Refused        []           Shingles  (Age 50 and older)   []        Prescribed    []     Refused        []           Immunization History   Administered Date(s)  "Administered    COVID-19 (PFIZER) Purple Cap Monovalent 01/12/2021, 02/04/2021, 12/21/2021    Flu Vaccine Split Quad 09/25/2020    Fluzone (or Fluarix & Flulaval for VFC) >6mos 10/12/2021, 09/23/2022, 09/23/2022, 10/06/2023    Hepatitis A 07/30/2020, 08/31/2023    Hepatitis B Adult/Adolescent IM 1995, 1995, 05/09/1996, 12/08/2017    Influenza Whole 10/01/2016    Influenza, Unspecified 10/25/2016, 12/04/2017, 11/20/2018, 10/03/2019, 09/25/2020    MMR 12/06/1996, 08/25/1999    PPD Test 09/22/2015, 10/25/2016, 08/10/2020, 10/05/2021, 09/20/2022    Tdap 06/28/2006, 01/01/2017, 10/09/2020     Health Maintenance   Topic Date Due    COVID-19 Vaccine (4 - 2023-24 season) 09/01/2023    ANNUAL PHYSICAL  02/10/2024    PAP SMEAR  06/27/2025    TDAP/TD VACCINES (4 - Td or Tdap) 10/09/2030    HEPATITIS C SCREENING  Completed    INFLUENZA VACCINE  Completed    Pneumococcal Vaccine 0-64  Aged Out        Diabetes  [] Yes  [] No   N/A      Date     Eye Exam     []             []   Complete     []   Recommended Date:  Where:       Foot Exam     []         []   Complete          Obesity Counseling     []       []   Complete     No results found for: \"HGBA1C\", \"MICROALBUR\"    Additional Testing      Date     Colorectal Screening       []   N/A   []   Complete    []   Ordered     Date:    Where:       Pap      []   N/A   []   Complete   []   OB/GYN Date:    Where:       Mammogram        []   N/A   []   Complete   []  OB/GYN   []   Ordered Date:    Where:     PSA  (Over age 50)    []   N/A   []   Complete   []   Ordered Date:    Where:     US Aorta  (For male smokers, age 65)     []   N/A   []   Complete   []   Ordered Date:    Where:     CT for Smoker  (Age 55-75, 30 pk yr)    []   N/A   []   Complete   []   Ordered Date:    Where:     Bone Density/DEXA      []   N/A   []   Complete   []   Ordered Date:    Follow-up:     Hep. C        []   N/A   []   Complete   []   Ordered Date:    Where:     Results for orders placed or " "performed in visit on 11/28/23   T4, Free    Specimen: Arm, Left; Blood   Result Value Ref Range    Free T4 1.71 (H) 0.93 - 1.70 ng/dL   TSH    Specimen: Arm, Left; Blood   Result Value Ref Range    TSH 1.900 0.270 - 4.200 uIU/mL            /78 (BP Location: Right arm, Patient Position: Sitting, Cuff Size: Adult)   Pulse 70   Ht 157.5 cm (62.01\")   Wt 57.3 kg (126 lb 6.4 oz)   SpO2 98%   BMI 23.11 kg/m²       Physical Exam  Vitals and nursing note reviewed.   Constitutional:       General: She is not in acute distress.     Appearance: Normal appearance. She is well-developed. She is not diaphoretic.   HENT:      Head: Normocephalic and atraumatic.      Right Ear: External ear normal.      Left Ear: External ear normal.      Nose: Nose normal.   Eyes:      General: No scleral icterus.        Right eye: No discharge.         Left eye: No discharge.      Conjunctiva/sclera: Conjunctivae normal.      Pupils: Pupils are equal, round, and reactive to light.   Neck:      Vascular: No carotid bruit or JVD.      Trachea: No tracheal deviation.   Cardiovascular:      Rate and Rhythm: Normal rate and regular rhythm.      Pulses: Normal pulses.      Heart sounds: Normal heart sounds. No murmur heard.     No friction rub. No gallop.   Pulmonary:      Effort: Pulmonary effort is normal. No respiratory distress.      Breath sounds: Normal breath sounds. No stridor. No wheezing or rales.   Chest:      Chest wall: No tenderness.   Breasts:     Right: Normal.      Left: Normal.      Comments: Fibrocystic areas bilaterally  Abdominal:      General: Bowel sounds are normal. There is no distension.      Palpations: Abdomen is soft. There is no mass.      Tenderness: There is no abdominal tenderness. There is no guarding or rebound.      Hernia: No hernia is present.   Musculoskeletal:         General: No tenderness or deformity. Normal range of motion.      Cervical back: Normal range of motion and neck supple.      Right " lower leg: No edema.      Left lower leg: No edema.   Lymphadenopathy:      Cervical: No cervical adenopathy.      Upper Body:      Right upper body: No supraclavicular, axillary or pectoral adenopathy.      Left upper body: No supraclavicular, axillary or pectoral adenopathy.   Skin:     General: Skin is warm and dry.      Coloration: Skin is not pale.      Findings: No erythema or rash.   Neurological:      Mental Status: She is alert and oriented to person, place, and time.      Cranial Nerves: No cranial nerve deficit.      Motor: No abnormal muscle tone.      Coordination: Coordination normal.      Deep Tendon Reflexes: Reflexes are normal and symmetric. Reflexes normal.   Psychiatric:         Behavior: Behavior normal.         Thought Content: Thought content normal.         Judgment: Judgment normal.             Counseling provided on diet and nutrition and exercise.    Diagnoses and all orders for this visit:    Annual physical exam  -     CBC Auto Differential; Future  -     Comprehensive Metabolic Panel; Future  -     Lipid Panel; Future  -     TSH Rfx On Abnormal To Free T4; Future    Hypothyroidism due to Hashimoto's thyroiditis  -     TSH Rfx On Abnormal To Free T4; Future    Vitamin D deficiency  -     Vitamin D,25-Hydroxy; Future     Screening labs ordered to evaluate chronic conditions. I will contact patient regarding test results and provide instructions regarding any necessary changes in plan of care.  Followed by Kiko for hypothyroidism  Nutrition and activity goals reviewed including: mainly water to drink, limit white flour/processed sugar, and processed foods, choose fresh fruits, vegetables, fish, lean meats,high fiber carbs, exercise  working toward 150 mins cardio per week, weight training 2x/week.    NAS Lazcano   3/15/2024   13:25 EDT          Please note that portions of this document were completed with a voice recognition program.     At [facility], we believe that  sharing information builds trust and better relationships. You are receiving this note because you are receiving care at [facility] or have recently visited. It is possible you will see health information before a provider has talked with you about it. This kind of information can be easy to misunderstand. To help you fully understand what it means for your health, we urge you to discuss this note with your provider.

## 2024-04-19 ENCOUNTER — LAB (OUTPATIENT)
Dept: LAB | Facility: HOSPITAL | Age: 29
End: 2024-04-19
Payer: COMMERCIAL

## 2024-04-19 DIAGNOSIS — E06.3 HYPOTHYROIDISM DUE TO HASHIMOTO'S THYROIDITIS: ICD-10-CM

## 2024-04-19 DIAGNOSIS — E03.8 HYPOTHYROIDISM DUE TO HASHIMOTO'S THYROIDITIS: ICD-10-CM

## 2024-04-19 DIAGNOSIS — Z00.00 ANNUAL PHYSICAL EXAM: ICD-10-CM

## 2024-04-19 DIAGNOSIS — E55.9 VITAMIN D DEFICIENCY: ICD-10-CM

## 2024-04-19 LAB
ALBUMIN SERPL-MCNC: 4.7 G/DL (ref 3.5–5.2)
ALBUMIN/GLOB SERPL: 1.7 G/DL
ALP SERPL-CCNC: 58 U/L (ref 39–117)
ALT SERPL W P-5'-P-CCNC: 19 U/L (ref 1–33)
ANION GAP SERPL CALCULATED.3IONS-SCNC: 14 MMOL/L (ref 5–15)
AST SERPL-CCNC: 17 U/L (ref 1–32)
BILIRUB SERPL-MCNC: 1.5 MG/DL (ref 0–1.2)
BUN SERPL-MCNC: 9 MG/DL (ref 6–20)
BUN/CREAT SERPL: 11.1 (ref 7–25)
CALCIUM SPEC-SCNC: 9.7 MG/DL (ref 8.6–10.5)
CHLORIDE SERPL-SCNC: 102 MMOL/L (ref 98–107)
CHOLEST SERPL-MCNC: 156 MG/DL (ref 0–200)
CO2 SERPL-SCNC: 22 MMOL/L (ref 22–29)
CREAT SERPL-MCNC: 0.81 MG/DL (ref 0.57–1)
EGFRCR SERPLBLD CKD-EPI 2021: 101.5 ML/MIN/1.73
GLOBULIN UR ELPH-MCNC: 2.7 GM/DL
GLUCOSE SERPL-MCNC: 107 MG/DL (ref 65–99)
HDLC SERPL-MCNC: 49 MG/DL (ref 40–60)
LDLC SERPL CALC-MCNC: 87 MG/DL (ref 0–100)
LDLC/HDLC SERPL: 1.73 {RATIO}
POTASSIUM SERPL-SCNC: 4 MMOL/L (ref 3.5–5.2)
PROT SERPL-MCNC: 7.4 G/DL (ref 6–8.5)
SODIUM SERPL-SCNC: 138 MMOL/L (ref 136–145)
TRIGL SERPL-MCNC: 110 MG/DL (ref 0–150)
TSH SERPL DL<=0.05 MIU/L-ACNC: 1.33 UIU/ML (ref 0.27–4.2)
VLDLC SERPL-MCNC: 20 MG/DL (ref 5–40)

## 2024-04-19 PROCEDURE — 80061 LIPID PANEL: CPT

## 2024-04-19 PROCEDURE — 82306 VITAMIN D 25 HYDROXY: CPT

## 2024-04-19 PROCEDURE — 80050 GENERAL HEALTH PANEL: CPT

## 2024-04-20 LAB
25(OH)D3 SERPL-MCNC: 34.5 NG/ML (ref 30–100)
BASOPHILS # BLD AUTO: 0.05 10*3/MM3 (ref 0–0.2)
BASOPHILS NFR BLD AUTO: 0.5 % (ref 0–1.5)
DEPRECATED RDW RBC AUTO: 37.5 FL (ref 37–54)
EOSINOPHIL # BLD AUTO: 0.11 10*3/MM3 (ref 0–0.4)
EOSINOPHIL NFR BLD AUTO: 1.1 % (ref 0.3–6.2)
ERYTHROCYTE [DISTWIDTH] IN BLOOD BY AUTOMATED COUNT: 11.8 % (ref 12.3–15.4)
HCT VFR BLD AUTO: 45.3 % (ref 34–46.6)
HGB BLD-MCNC: 15.5 G/DL (ref 12–15.9)
LYMPHOCYTES # BLD AUTO: 2.76 10*3/MM3 (ref 0.7–3.1)
LYMPHOCYTES NFR BLD AUTO: 26.5 % (ref 19.6–45.3)
MCH RBC QN AUTO: 30.4 PG (ref 26.6–33)
MCHC RBC AUTO-ENTMCNC: 34.2 G/DL (ref 31.5–35.7)
MCV RBC AUTO: 88.8 FL (ref 79–97)
MONOCYTES # BLD AUTO: 0.46 10*3/MM3 (ref 0.1–0.9)
MONOCYTES NFR BLD AUTO: 4.4 % (ref 5–12)
NEUTROPHILS NFR BLD AUTO: 6.96 10*3/MM3 (ref 1.7–7)
NEUTROPHILS NFR BLD AUTO: 66.9 % (ref 42.7–76)
PLATELET # BLD AUTO: 256 10*3/MM3 (ref 140–450)
PMV BLD AUTO: 11.4 FL (ref 6–12)
RBC # BLD AUTO: 5.1 10*6/MM3 (ref 3.77–5.28)
WBC NRBC COR # BLD AUTO: 10.4 10*3/MM3 (ref 3.4–10.8)

## 2024-05-30 ENCOUNTER — OFFICE VISIT (OUTPATIENT)
Dept: ENDOCRINOLOGY | Facility: CLINIC | Age: 29
End: 2024-05-30
Payer: COMMERCIAL

## 2024-05-30 VITALS
SYSTOLIC BLOOD PRESSURE: 116 MMHG | BODY MASS INDEX: 25.58 KG/M2 | OXYGEN SATURATION: 100 % | HEIGHT: 62 IN | DIASTOLIC BLOOD PRESSURE: 72 MMHG | WEIGHT: 139 LBS | HEART RATE: 77 BPM

## 2024-05-30 DIAGNOSIS — E06.3 HYPOTHYROIDISM DUE TO HASHIMOTO'S THYROIDITIS: Primary | ICD-10-CM

## 2024-05-30 DIAGNOSIS — E03.8 HYPOTHYROIDISM DUE TO HASHIMOTO'S THYROIDITIS: Primary | ICD-10-CM

## 2024-05-30 DIAGNOSIS — E04.1 SOLITARY THYROID NODULE: ICD-10-CM

## 2024-05-30 NOTE — PROGRESS NOTES
"Chief Complaint   Patient presents with    Hypothyroidism        HPI   Alla Waldron is a 28 y.o. female had concerns including Hypothyroidism.      She is feeling better. Has eliminated gluten and migraines better.     On levothyroxine 100 mcg daily and TSH recently normal.     The following portions of the patient's history were reviewed and updated as appropriate: allergies, current medications, past family history, past medical history, past social history, past surgical history, and problem list.    Review of Systems   Constitutional: Negative.    Endocrine: Negative.         /72 (BP Location: Right arm, Patient Position: Sitting, Cuff Size: Adult)   Pulse 77   Ht 157.5 cm (62.01\")   Wt 63 kg (139 lb)   SpO2 100%   BMI 25.42 kg/m²      Physical Exam  Vitals reviewed.   Constitutional:       Appearance: Normal appearance.   Cardiovascular:      Rate and Rhythm: Normal rate.   Pulmonary:      Effort: Pulmonary effort is normal.   Neurological:      General: No focal deficit present.      Mental Status: She is alert. Mental status is at baseline.   Psychiatric:         Mood and Affect: Mood normal.         Behavior: Behavior normal.              LABS AND IMAGING   CMP  Lab Results   Component Value Date    GLUCOSE 107 (H) 04/19/2024    BUN 9 04/19/2024    CREATININE 0.81 04/19/2024    EGFRIFNONA 74 01/28/2022    BCR 11.1 04/19/2024    K 4.0 04/19/2024    CO2 22.0 04/19/2024    CALCIUM 9.7 04/19/2024    ALBUMIN 4.7 04/19/2024    AST 17 04/19/2024    ALT 19 04/19/2024        CBC w/DIFF   Lab Results   Component Value Date    WBC 10.40 04/19/2024    RBC 5.10 04/19/2024    HGB 15.5 04/19/2024    HCT 45.3 04/19/2024    MCV 88.8 04/19/2024    MCH 30.4 04/19/2024    MCHC 34.2 04/19/2024    RDW 11.8 (L) 04/19/2024    RDWSD 37.5 04/19/2024    MPV 11.4 04/19/2024     04/19/2024    NEUTRORELPCT 66.9 04/19/2024    LYMPHORELPCT 26.5 04/19/2024    MONORELPCT 4.4 (L) 04/19/2024    EOSRELPCT 1.1 04/19/2024 "    BASORELPCT 0.5 04/19/2024    AUTOIGPER 0.4 02/10/2023    NEUTROABS 6.96 04/19/2024    LYMPHSABS 2.76 04/19/2024    MONOSABS 0.46 04/19/2024    EOSABS 0.11 04/19/2024    BASOSABS 0.05 04/19/2024    AUTOIGNUM 0.02 02/10/2023    NRBC 0.0 02/10/2023     TSH  Lab Results   Component Value Date    TSH 1.330 04/19/2024    TSH 1.900 11/28/2023    TSH 2.720 05/24/2023     T4  Lab Results   Component Value Date    FREET4 1.71 (H) 11/28/2023    FREET4 1.40 05/24/2023    FREET4 1.37 09/19/2022     TPO  Lab Results   Component Value Date    THYROIDAB 304 (H) 01/28/2022       Thyroid Ultrasound 05/24/23     Indication: Thyroid nodule  Comparison Imaging: Ultrasound 2022  Clinical History:  Hypothyroidism, solitary thyroid nodule     Real time high resolution imaging of the thyroid gland was performed in transverse and longitudinal planes. Previous imaging reports were reviewed if available and compared to the current to assess stability.      Lobes:  The right lobe measured 5.0 cm L x 1.6 cm AP x 1.9 cm in TV dimension.    The isthmus measured 0.2 cm in thickness.    The left thyroid lobe measured 5.1 cm L x 1.6 cm AP x 1.8 cm in TV dimension.     Thyroid gland is heterogeneous and contains a single left lobe nodule.     Nodule 1   located in the left mid lobe and measures 0.9 x 0.8 x 0.9 cm (L X AP X TV).  This nodule is solid, heterogeneous, hyperechoic with well-defined margins, and Grade I vascularity on Color Flow Doppler.  It has no artifacts.  This nodule has decreased slightly in size from last year when it measured 1.1 cm.        No pathologic lymph nodes were seen.     Impression:  Diffusely heterogeneous thyroid consistent with autoimmune thyroid disease. No distinct right lobe nodules.  Left lobe solid nodule measuring 0.9 cm. Slightly decreased in size from prior ultrasound when it measured 1.1 cm in 2022.     Recommendation:   Repeat ultrasound in 1 year.  If stable, decrease frequency of  "monitoring/discontinue.        Thyroid Ultrasound 05/30/24    Indication: thyroid nodule  Comparison Imaging: ultrasound 2022, 2023  Clinical History: left 9 mm thyroid nodule, hypothyroidism,     Real time high resolution imaging of the thyroid gland was performed in transverse and longitudinal planes. Previous imaging reports were reviewed if available and compared to the current to assess stability.     Lobes:  The right lobe measured 4.3 cm L x 1.6 cm AP x 1.7 cm in TV dimension.    The isthmus measured 0.2 cm in thickness.    The left thyroid lobe measured 4.8 cm L x 1.4 cm AP x 1.5 cm in TV dimension.    Thyroid gland is heterogeneous and contains single nodules.    Nodule 1   located in the left mid lobe and measures 9 x 7 x 8 cm (L X AP X TV).  This nodule is solid, homogeneous, hyperechoic with well-defined margins, and Grade I vascularity on Color Flow Doppler.  It has no artifacts.    No pathologic lymph nodes were seen.    Impression:  Diffusely heterogeneous gland with a single subcentimeter left lobe nodule measuring 9 mm.  Slightly decreased in size from prior ultrasound.  This nodule is consistent with a \"white corona\" nodule seen with Hashimoto's.      Recommendation:   No routine ultrasound monitoring is necessary.      Assessment and Plan    Diagnoses and all orders for this visit:    1. Hypothyroidism due to Hashimoto's thyroiditis (Primary)  Diagnosed 1/2022.   Clinically and biochemically euthyroid on levothyroxine 100 mcg daily.   Will be family planning in the next few years. Pt reminded to take two additional tabs per week and call/message when she has a positive pregnancy test.     2. Solitary thyroid nodule  Stable subcentimeter left nodule, decreased in size from prior ultrasounds. No routine ultrasound monitoring is needed.  -     US Thyroid         Return in about 1 year (around 5/30/2025) for next scheduled follow up. The patient was instructed to contact the clinic with any interval " questions or concerns.    Electronically signed by: Becca Ramesh DO   Endocrinologist    Please note that portions of this note were completed with a voice recognition program.

## 2024-08-27 DIAGNOSIS — E06.3 HYPOTHYROIDISM DUE TO HASHIMOTO'S THYROIDITIS: ICD-10-CM

## 2024-08-27 DIAGNOSIS — E03.8 HYPOTHYROIDISM DUE TO HASHIMOTO'S THYROIDITIS: ICD-10-CM

## 2024-08-27 NOTE — TELEPHONE ENCOUNTER
Rx Refill Note  Requested Prescriptions     Pending Prescriptions Disp Refills    levothyroxine (SYNTHROID, LEVOTHROID) 100 MCG tablet [Pharmacy Med Name: LEVOTHYROXINE 0.100MG (100MCG) TAB] 90 tablet 1     Sig: TAKE 1 TABLET BY MOUTH DAILY          Last office visit with prescribing clinician: 5/30/2024     Next office visit with prescribing clinician: 6/5/2025         Debra Patiño MA  08/27/24, 16:15 EDT

## 2024-08-28 RX ORDER — LEVOTHYROXINE SODIUM 100 UG/1
100 TABLET ORAL DAILY
Qty: 90 TABLET | Refills: 3 | Status: SHIPPED | OUTPATIENT
Start: 2024-08-28

## 2024-09-09 DIAGNOSIS — G43.109 MIGRAINE WITH AURA AND WITHOUT STATUS MIGRAINOSUS, NOT INTRACTABLE: ICD-10-CM

## 2024-09-09 RX ORDER — SUMATRIPTAN 25 MG/1
TABLET, FILM COATED ORAL
Qty: 9 TABLET | Refills: 1 | Status: SHIPPED | OUTPATIENT
Start: 2024-09-09

## 2024-10-24 ENCOUNTER — CLINICAL SUPPORT (OUTPATIENT)
Dept: FAMILY MEDICINE CLINIC | Facility: CLINIC | Age: 29
End: 2024-10-24

## 2024-10-24 DIAGNOSIS — Z23 IMMUNIZATION DUE: Primary | ICD-10-CM

## 2024-10-24 PROCEDURE — 90656 IIV3 VACC NO PRSV 0.5 ML IM: CPT | Performed by: FAMILY MEDICINE

## 2024-10-24 PROCEDURE — 90471 IMMUNIZATION ADMIN: CPT | Performed by: FAMILY MEDICINE

## 2024-12-19 DIAGNOSIS — E06.3 HYPOTHYROIDISM DUE TO HASHIMOTO'S THYROIDITIS: ICD-10-CM

## 2024-12-19 RX ORDER — LEVOTHYROXINE SODIUM 100 UG/1
100 TABLET ORAL DAILY
Qty: 90 TABLET | Refills: 3 | OUTPATIENT
Start: 2024-12-19

## 2025-02-05 DIAGNOSIS — E06.3 HYPOTHYROIDISM DUE TO HASHIMOTO'S THYROIDITIS: ICD-10-CM

## 2025-02-05 RX ORDER — LEVOTHYROXINE SODIUM 100 UG/1
100 TABLET ORAL DAILY
Qty: 90 TABLET | Refills: 1 | Status: SHIPPED | OUTPATIENT
Start: 2025-02-05

## 2025-02-05 NOTE — TELEPHONE ENCOUNTER
Rx Refill Note  Requested Prescriptions     Pending Prescriptions Disp Refills    levothyroxine (SYNTHROID, LEVOTHROID) 100 MCG tablet 90 tablet 1     Sig: TAKE 1 TABLET BY MOUTH DAILY      Last office visit with prescribing clinician: 5/30/2024   Last telemedicine visit with prescribing clinician: Visit date not found   Next office visit with prescribing clinician: 6/5/2025                         Would you like a call back once the refill request has been completed: [] Yes [] No    If the office needs to give you a call back, can they leave a voicemail: [] Yes [] No    Letitia Bailey MA  02/05/25, 13:30 EST

## 2025-02-23 ENCOUNTER — HOSPITAL ENCOUNTER (EMERGENCY)
Facility: HOSPITAL | Age: 30
Discharge: HOME | End: 2025-02-23
Attending: EMERGENCY MEDICINE | Admitting: EMERGENCY MEDICINE
Payer: COMMERCIAL

## 2025-02-23 VITALS
OXYGEN SATURATION: 98 % | WEIGHT: 125.4 LBS | RESPIRATION RATE: 16 BRPM | HEART RATE: 80 BPM | BODY MASS INDEX: 22.22 KG/M2 | TEMPERATURE: 98.7 F | SYSTOLIC BLOOD PRESSURE: 114 MMHG | HEIGHT: 63 IN | DIASTOLIC BLOOD PRESSURE: 81 MMHG

## 2025-02-23 DIAGNOSIS — R55 VASOVAGAL SYNCOPE: Primary | ICD-10-CM

## 2025-02-23 LAB
ALBUMIN SERPL-MCNC: 4.5 G/DL (ref 3.5–5.2)
ALBUMIN/GLOB SERPL: 1.8 G/DL
ALP SERPL-CCNC: 52 U/L (ref 39–117)
ALT SERPL W P-5'-P-CCNC: 9 U/L (ref 1–33)
ANION GAP SERPL CALCULATED.3IONS-SCNC: 13.7 MMOL/L (ref 5–15)
AST SERPL-CCNC: 16 U/L (ref 1–32)
B-HCG UR QL: NEGATIVE
BACTERIA UR QL AUTO: ABNORMAL /HPF
BASOPHILS # BLD AUTO: 0.02 10*3/MM3 (ref 0–0.2)
BASOPHILS NFR BLD AUTO: 0.3 % (ref 0–1.5)
BILIRUB SERPL-MCNC: 0.9 MG/DL (ref 0–1.2)
BILIRUB UR QL STRIP: NEGATIVE
BUN SERPL-MCNC: 10 MG/DL (ref 6–20)
BUN/CREAT SERPL: 13.2 (ref 7–25)
CALCIUM SPEC-SCNC: 9.2 MG/DL (ref 8.6–10.5)
CHLORIDE SERPL-SCNC: 104 MMOL/L (ref 98–107)
CLARITY UR: CLEAR
CO2 SERPL-SCNC: 21.3 MMOL/L (ref 22–29)
COLOR UR: YELLOW
CREAT SERPL-MCNC: 0.76 MG/DL (ref 0.57–1)
D DIMER PPP FEU-MCNC: <0.27 MCGFEU/ML (ref 0–0.5)
DEPRECATED RDW RBC AUTO: 38.4 FL (ref 37–54)
EGFRCR SERPLBLD CKD-EPI 2021: 108.9 ML/MIN/1.73
EOSINOPHIL # BLD AUTO: 0.1 10*3/MM3 (ref 0–0.4)
EOSINOPHIL NFR BLD AUTO: 1.3 % (ref 0.3–6.2)
ERYTHROCYTE [DISTWIDTH] IN BLOOD BY AUTOMATED COUNT: 11.8 % (ref 12.3–15.4)
GEN 5 1HR TROPONIN T REFLEX: <6 NG/L
GLOBULIN UR ELPH-MCNC: 2.5 GM/DL
GLUCOSE SERPL-MCNC: 107 MG/DL (ref 65–99)
GLUCOSE UR STRIP-MCNC: NEGATIVE MG/DL
HCT VFR BLD AUTO: 42.1 % (ref 34–46.6)
HGB BLD-MCNC: 14.2 G/DL (ref 12–15.9)
HGB UR QL STRIP.AUTO: NEGATIVE
HOLD SPECIMEN: NORMAL
HYALINE CASTS UR QL AUTO: ABNORMAL /LPF
IMM GRANULOCYTES # BLD AUTO: 0.01 10*3/MM3 (ref 0–0.05)
IMM GRANULOCYTES NFR BLD AUTO: 0.1 % (ref 0–0.5)
KETONES UR QL STRIP: NEGATIVE
LEUKOCYTE ESTERASE UR QL STRIP.AUTO: ABNORMAL
LYMPHOCYTES # BLD AUTO: 1.95 10*3/MM3 (ref 0.7–3.1)
LYMPHOCYTES NFR BLD AUTO: 24.5 % (ref 19.6–45.3)
MAGNESIUM SERPL-MCNC: 2.1 MG/DL (ref 1.6–2.6)
MCH RBC QN AUTO: 29.8 PG (ref 26.6–33)
MCHC RBC AUTO-ENTMCNC: 33.7 G/DL (ref 31.5–35.7)
MCV RBC AUTO: 88.3 FL (ref 79–97)
MONOCYTES # BLD AUTO: 0.39 10*3/MM3 (ref 0.1–0.9)
MONOCYTES NFR BLD AUTO: 4.9 % (ref 5–12)
MUCOUS THREADS URNS QL MICRO: ABNORMAL /HPF
NEUTROPHILS NFR BLD AUTO: 5.49 10*3/MM3 (ref 1.7–7)
NEUTROPHILS NFR BLD AUTO: 68.9 % (ref 42.7–76)
NITRITE UR QL STRIP: NEGATIVE
PH UR STRIP.AUTO: 6 [PH] (ref 5–8)
PLATELET # BLD AUTO: 243 10*3/MM3 (ref 140–450)
PMV BLD AUTO: 10 FL (ref 6–12)
POTASSIUM SERPL-SCNC: 3.8 MMOL/L (ref 3.5–5.2)
PROT SERPL-MCNC: 7 G/DL (ref 6–8.5)
PROT UR QL STRIP: NEGATIVE
RBC # BLD AUTO: 4.77 10*6/MM3 (ref 3.77–5.28)
RBC # UR STRIP: ABNORMAL /HPF
REF LAB TEST METHOD: ABNORMAL
SODIUM SERPL-SCNC: 139 MMOL/L (ref 136–145)
SP GR UR STRIP: 1.01 (ref 1–1.03)
SQUAMOUS #/AREA URNS HPF: ABNORMAL /HPF
TRANS CELLS #/AREA URNS HPF: ABNORMAL /HPF
TROPONIN T NUMERIC DELTA: NORMAL
TROPONIN T SERPL HS-MCNC: <6 NG/L
TSH SERPL DL<=0.05 MIU/L-ACNC: 5.39 UIU/ML (ref 0.27–4.2)
UROBILINOGEN UR QL STRIP: ABNORMAL
WBC # UR STRIP: ABNORMAL /HPF
WBC NRBC COR # BLD AUTO: 7.96 10*3/MM3 (ref 3.4–10.8)
WHOLE BLOOD HOLD COAG: NORMAL
WHOLE BLOOD HOLD SPECIMEN: NORMAL

## 2025-02-23 PROCEDURE — 85379 FIBRIN DEGRADATION QUANT: CPT

## 2025-02-23 PROCEDURE — 36415 COLL VENOUS BLD VENIPUNCTURE: CPT

## 2025-02-23 PROCEDURE — 84484 ASSAY OF TROPONIN QUANT: CPT | Performed by: EMERGENCY MEDICINE

## 2025-02-23 PROCEDURE — 81001 URINALYSIS AUTO W/SCOPE: CPT

## 2025-02-23 PROCEDURE — 83735 ASSAY OF MAGNESIUM: CPT | Performed by: EMERGENCY MEDICINE

## 2025-02-23 PROCEDURE — 93005 ELECTROCARDIOGRAM TRACING: CPT | Performed by: EMERGENCY MEDICINE

## 2025-02-23 PROCEDURE — 81025 URINE PREGNANCY TEST: CPT | Performed by: EMERGENCY MEDICINE

## 2025-02-23 PROCEDURE — 80050 GENERAL HEALTH PANEL: CPT | Performed by: EMERGENCY MEDICINE

## 2025-02-23 PROCEDURE — 99283 EMERGENCY DEPT VISIT LOW MDM: CPT

## 2025-02-23 RX ORDER — SODIUM CHLORIDE 0.9 % (FLUSH) 0.9 %
10 SYRINGE (ML) INJECTION AS NEEDED
Status: DISCONTINUED | OUTPATIENT
Start: 2025-02-23 | End: 2025-02-23 | Stop reason: HOSPADM

## 2025-02-23 NOTE — FSED PROVIDER NOTE
CHIEF COMPLAINT  Syncope     HISTORY OF PRESENT ILLNESS:  Alla Waldron is a 29 y.o. female who presents with syncopal episode that occurred around 11 AM.  She states she does have a history of hypothyroidism and is on Synthroid.  States that she does have a primary history of multiple episodes of vasovagal syncope over her life that is easily triggered by multiple factors.  Today, she states that she was laying in her bed when she developed what she thought was a muscle cramp in her right tricep and she sat up felt nauseous and then syncopized for approximately 30 seconds and came back to baseline rather quickly.  Her  witnessed this event and provide see additional details as the patient does not recall any events after sitting up.  She states that she feels normal now has some residual crampy pain to her right tricep.  She has not seen a cardiologist or neurologist for her frequent vasovagal syncope's.  She does state that she was diagnosed with Hashimoto's and started Synthroid and that was improving her syncopal episodes.  She also reports that she had some urinary incontinence with the event which was different than previous.  Her  denies any abnormal movements of her extremities and she does not bite her tongue.  She denies fever chills stiff neck blurry vision or headache or preceding palpitations chest pain or shortness of breath.  She is on oral contraceptive      PAST MEDICAL HISTORY  Past Medical History:   Diagnosis Date    Depression     for 10 mths in 2014    Hashimoto's thyroiditis 1/28/22    Hypothyroidism 10/19/20    Elevated TSH, but normal T4    Migraines 2018    Solitary thyroid nodule 05/16/2022    Thyroid nodule 2/8/22    11mm nodule left lobe     Reviewed the imported nursing notes    PAST SURGICAL HISTORY  Past Surgical History:   Procedure Laterality Date    WISDOM TOOTH EXTRACTION  01/01/2014     Reviewed the imported nursing notes    ALLERGIES  Allergies   Allergen  "Reactions    Penicillins        MEDICATIONS       Medication List        CONTINUE taking these medications      * Apri 0.15-30 MG-MCG per tablet  Generic drug: desogestrel-ethinyl estradiol     * Apri 0.15-30 MG-MCG per tablet  Generic drug: desogestrel-ethinyl estradiol  Take 1 tablet by mouth Daily.     levothyroxine 100 MCG tablet  Commonly known as: SYNTHROID, LEVOTHROID  TAKE 1 TABLET BY MOUTH DAILY     Loratadine 10 MG capsule     SUMAtriptan 25 MG tablet  Commonly known as: IMITREX  TAKE 1 TABLET BY MOUTH AT ONSET OF HEADACHE; MAY REPEAT 1 TABLET IN 2 HOURS IF NEEDED.           * This list has 2 medication(s) that are the same as other medications prescribed for you. Read the directions carefully, and ask your doctor or other care provider to review them with you.                SOCIAL HISTORY    Social History     Tobacco Use    Smoking status: Never     Passive exposure: Never    Smokeless tobacco: Never   Vaping Use    Vaping status: Never Used   Substance Use Topics    Alcohol use: Yes     Alcohol/week: 1.0 standard drink of alcohol     Types: 1 Glasses of wine per week     Comment: occasionally    Drug use: No     The patient otherwise denies any further social history.  Reviewed the imported nursing notes      FAMILY HISTORY  Family History   Problem Relation Age of Onset    Depression Mother     Hyperlipidemia Mother     Thyroid disease Mother         hypothyroidism    Hypertension Mother     Depression Father     Hypertension Father     Thyroid disease Father         hypothyroidism    Breast cancer Paternal Grandmother     Ovarian cancer Neg Hx      The patient otherwise patient denies any further family history.  Reviewed the imported nursing notes      REVIEW OF SYSTEMS  Reviewed and negative/not pertinent unless mentioned in the HPI        PHYSICAL EXAM  Vitals: /81   Pulse 80   Temp 98.7 °F (37.1 °C) (Oral)   Resp 16   Ht 160 cm (63\")   Wt 56.9 kg (125 lb 6.4 oz)   LMP 01/27/2025 " (Approximate)   SpO2 98%   BMI 22.21 kg/m²      General Appearance: Awake and Alert, cooperative, no distress   Head:  Normocephalic, atraumatic   Eyes: Conjunctiva clear, corneas clear   Ears:  Normal external ears,   Nose: Nares normal and clear   Throat: Lips, mucosa moist   Neck:  Trachea midline, no stridor   Lungs:  Clear to auscultation bilaterally, respirations unlabored   Heart: Regular, No rub   Abdomen: Nondistended, non tender   Genitourinary:  Pelvic:  Rectal: Not Performed  Not Performed  Not Performed   Extremities: Extremities Atraumatic   Vascular: Present in all extremities   Skin:  no rashes or lesions   Neurologic: Non Focal , CN 2-12 grossly intact, GCS 15   Psyc: Not Performed         MEDICAL DECISION MAKING - ED COURSE/PROCEDURE/DDX:    PULSE OX: Interpretation by me on room air Is normal  SpO2 Readings from Last 1 Encounters:   02/23/25 98%          CARDIAC MONITOR: Normal sinus rhythm  Pulse Readings from Last 1 Encounters:   02/23/25 80            I reviewed the nursing notes: YES  I reviewed and discussed with EMS:   External documents reviewed:   Additional history taken from: Previous notes     Discussed with consulting physician  additionally, the admitting / accepting provider was contacted with handoff      LAB REVIEWED: Interpretation of all unique test ordered  Labs Reviewed   COMPREHENSIVE METABOLIC PANEL - Abnormal; Notable for the following components:       Result Value    Glucose 107 (*)     CO2 21.3 (*)     All other components within normal limits    Narrative:     GFR Categories in Chronic Kidney Disease (CKD)      GFR Category          GFR (mL/min/1.73)    Interpretation  G1                     90 or greater         Normal or high (1)  G2                      60-89                Mild decrease (1)  G3a                   45-59                Mild to moderate decrease  G3b                   30-44                Moderate to severe decrease  G4                    15-29         "        Severe decrease  G5                    14 or less           Kidney failure          (1)In the absence of evidence of kidney disease, neither GFR category G1 or G2 fulfill the criteria for CKD.    eGFR calculation 2021 CKD-EPI creatinine equation, which does not include race as a factor   CBC WITH AUTO DIFFERENTIAL - Abnormal; Notable for the following components:    RDW 11.8 (*)     Monocyte % 4.9 (*)     All other components within normal limits   URINALYSIS W/ MICROSCOPIC IF INDICATED (NO CULTURE) - Abnormal; Notable for the following components:    Leuk Esterase, UA Small (1+) (*)     All other components within normal limits   TSH - Abnormal; Notable for the following components:    TSH 5.390 (*)     All other components within normal limits   URINALYSIS, MICROSCOPIC ONLY - Abnormal; Notable for the following components:    WBC, UA 3-5 (*)     Bacteria, UA 1+ (*)     Squamous Epithelial Cells, UA 3-6 (*)     All other components within normal limits   MAGNESIUM - Normal   TROPONIN - Normal   D-DIMER, QUANTITATIVE - Normal    Narrative:     According to the assay 's published package insert, a normal (<0.50 MCGFEU/mL) D-dimer result in conjunction with a non-high clinical probability assessment, excludes deep vein thrombosis (DVT) and pulmonary embolism (PE) with high sensitivity.    D-dimer values increase with age and this can make VTE exclusion of an older population difficult. To address this, the [employer], based on best available evidence and recent guidelines, recommends that clinicians use age-adjusted D-dimer thresholds in patients greater than 50 years of age with: a) a low probability of PE who do not meet all Pulmonary Embolism Rule Out Criteria, or b) in those with intermediate probability of PE.   The formula for an age-adjusted D-dimer cut-off is \"age/100\".  For example, a 60 year old patient would have an age-adjusted cut-off of 0.60 MCGFEU/mL and an 80 " year old 0.80 MCGFEU/mL.   PREGNANCY, URINE - Normal   RAINBOW DRAW    Narrative:     The following orders were created for panel order [place] Draw.  Procedure                               Abnormality         Status                     ---------                               -----------         ------                     Green Top (Gel)[[id number]]                                  Final result               Lavender Top[[id number]]                                     Final result               Gold Top - SST[[id number]]                                   Final result               Colón Top[[id number]]                                         Final result               Light Blue Top[[id number]]                                   Final result                 Please view results for these tests on the individual orders.   HIGH SENSITIVITIY TROPONIN T 1HR    Narrative:     High Sensitive Troponin T Reference Range:  <14.0 ng/L- Negative Female for AMI  <22.0 ng/L- Negative Male for AMI  >=14 - Abnormal Female indicating possible myocardial injury.  >=22 - Abnormal Male indicating possible myocardial injury.   Clinicians would have to utilize clinical acumen, EKG, Troponin, and serial changes to determine if it is an Acute Myocardial Infarction or myocardial injury due to an underlying chronic condition.        CBC AND DIFFERENTIAL    Narrative:     The following orders were created for panel order CBC & Differential.  Procedure                               Abnormality         Status                     ---------                               -----------         ------                     CBC Auto Differential[[id number]]        Abnormal            Final result                 Please view results for these tests on the individual orders.   GREEN TOP   LAVENDER TOP   GOLD TOP - SST   GRAY TOP   LIGHT BLUE TOP       IMAGING REVIEWED  My X-ray interpretation:   My CT interpretation:   My Ultrasound interpretation:     No orders  to display       Procedures:    Decision rules/scores:        Drug therapy provided in the ED and monitored as needed given IV/PO :   Medications   sodium chloride 0.9 % flush 10 mL (has no administration in time range)       Vitals Trend:  Vitals:    02/23/25 1315 02/23/25 1330 02/23/25 1537 02/23/25 1612   BP: 128/84 136/90 114/81    BP Location:       Patient Position:       Pulse: 97 87 87 80   Resp:    16   Temp:       TempSrc:       SpO2:  100% 96% 98%   Weight:       Height:           Alla Waldron is a 29 y.o. female who presents to the emergency department with the acute illness as stated within HPI  Shared medical decision making regarding a detailed discussion with the patient concerning this ED encounter, the differential diagnosis considered vasovagal syncope, cardiogenic syncope, seizure,  neurogenic syncope, pregnancy, and the emergency room imaging and lab testing done today The patient and/or family have been given an opportunity to ask questions and exhibit an understanding of what happened today in the emergency room.        ED Course as of 02/23/25 1807   Sun Feb 23, 2025   1552 Urinalysis, Microscopic Only - Urine, Clean Catch(!) [NC]   1552 Urinalysis With Microscopic If Indicated (No Culture) - Urine, Clean Catch(!) [NC]   1552 CBC & Differential(!) [NC]   1552 Magnesium [NC]   1552 Urinalysis With Microscopic If Indicated (No Culture) - Urine, Clean Catch(!)  Asymptomatic appears to be contaminated [NC]   1553 Comprehensive Metabolic Panel(!) [NC]   1553 TSH(!) [NC]   1553 High Sensitivity Troponin T 1Hr [NC]   1553 Pregnancy, Urine - Urine, Clean Catch [NC]   1553 TSH(!)  Possible just acute phase reactant elevation from her syncopal episode versus needing repeat evaluation with TSH in 2 weeks for possible increasing of the Synthroid. [NC]      ED Course User Index  [NC] Robbie Mendes PA-C           Condition considered emergent due to:  1) Acuity  2) Failure or unavailable  treatment in the outpatient setting  3) Risk of deterioration and decompensation given the multiple differential diagnoses that  need to be ruled out      Amount and/or Complexity of Data Reviewed  Clinical lab tests: as above noted in MDM  Tests in the radiology section of CPT®: as above noted in MDM  Tests in the medicine section of CPT®: as above noted in MDM  Independent visualization of images, tracings, or specimens: as above noted in MDM        CLINICAL IMPRESSION:  Final diagnoses:   Vasovagal syncope      DISPOSITION:  Discharge      As with my usual standard practice patient was advised to return for any reason for any  complaint at any time whatsoever. Please refer to the discharge instructions, AVS paperwork  and prescriptions written for treatment plan.        Electronically signed by Robbie Mendes PA-C, 02/23/25, 1:09 PM EST.      This report was dictated utilizing a voice recognition system which has a propensity to miss  small words such as a, an, the, no, he,she,him, her,his and not. Please read this report carefully,  and if any discrepancy or uncertainty is present, please contact the author directly for  clarification

## 2025-02-24 LAB
QT INTERVAL: 362 MS
QTC INTERVAL: 430 MS

## 2025-02-25 ENCOUNTER — TELEPHONE (OUTPATIENT)
Dept: CARDIOLOGY | Facility: CLINIC | Age: 30
End: 2025-02-25
Payer: COMMERCIAL

## 2025-02-25 NOTE — TELEPHONE ENCOUNTER
Caller: Alla Waldron    Relationship to patient: Self    Best call back number: [phone]    New or established patient?  [x] New  [] Established    Date of discharge: 02/23/25    Facility discharged from:  [facility]    Diagnosis/Symptoms: SYNCOPE    Length of stay (If applicable): ER VISIT    Specialty Only: Did you see a [facility] provider?    [] Yes  [x] No  If so, who?     Additional Details: PER DISCHARGE NOTE, STATES TO FU WITH DR. CHRISTENSEN. NO TIME FRAME SPECIFIED. PLEASE REACH OUT TO PATIENT TO SCHEDULE, THANK YOU.

## 2025-02-26 ENCOUNTER — OFFICE VISIT (OUTPATIENT)
Dept: FAMILY MEDICINE CLINIC | Facility: CLINIC | Age: 30
End: 2025-02-26
Payer: COMMERCIAL

## 2025-02-26 ENCOUNTER — SPECIALTY PHARMACY (OUTPATIENT)
Dept: PHARMACY | Facility: TELEHEALTH | Age: 30
End: 2025-02-26
Payer: COMMERCIAL

## 2025-02-26 VITALS
HEART RATE: 80 BPM | TEMPERATURE: 98.5 F | BODY MASS INDEX: 22.22 KG/M2 | DIASTOLIC BLOOD PRESSURE: 78 MMHG | SYSTOLIC BLOOD PRESSURE: 120 MMHG | WEIGHT: 125.4 LBS | OXYGEN SATURATION: 100 % | HEIGHT: 63 IN

## 2025-02-26 DIAGNOSIS — G43.109 MIGRAINE WITH AURA AND WITHOUT STATUS MIGRAINOSUS, NOT INTRACTABLE: ICD-10-CM

## 2025-02-26 DIAGNOSIS — E06.3 HYPOTHYROIDISM DUE TO HASHIMOTO'S THYROIDITIS: ICD-10-CM

## 2025-02-26 DIAGNOSIS — Z76.89 ENCOUNTER TO ESTABLISH CARE: Primary | ICD-10-CM

## 2025-02-26 PROBLEM — G43.909 MIGRAINE: Status: ACTIVE | Noted: 2025-02-26

## 2025-02-26 PROCEDURE — 99214 OFFICE O/P EST MOD 30 MIN: CPT

## 2025-02-26 RX ORDER — SUMATRIPTAN SUCCINATE 25 MG/1
TABLET ORAL
Qty: 9 TABLET | Refills: 3 | Status: SHIPPED | OUTPATIENT
Start: 2025-02-26

## 2025-02-26 NOTE — PROGRESS NOTES
New Patient Office Visit      Date: 2025   Patient Name: Alla Waldron  : 1995   MRN: [id number]     Chief Complaint:    Chief Complaint   Patient presents with    Migraine       History of Present Illness: Alla Waldron is a 29 y.o. female who is here today to establish care.  Patient previously under the care of of Loren Siegel previously.  Past medical history includes Hashimoto's thyroiditis, migraine, thyroid nodule.    Migraines: She states she will get between 2-3 migraines per month.  She has been on sumatriptan for 1 year now.  Patient states that sumatriptan hand works to abort her migraines most of the time, but does cause unpleasant side effects including dizziness, nausea.  She states it also takes several hours for it to kick in.  She states that her migraines last anywhere from 4 to 24 hours.  Patient states she has an isma that she uses to track her migraines. She states that she notices that it they occur more with weather changes. Patient states with her migraines that she does have an aura and can tell when it is coming on.  Patient states that if her migraines are not stopped she will develop photophobia, sound sensitivity, nausea and vomiting.  Patient states that due to the unpleasant side effects of sumatriptan hand she would like to try different medication for abortive therapies.  She is interested in trying Nurtec.  Has not tried any other triptans.  Has not been on preventative medication for migraines in the past.  Keeps a good sleep routine.  Tries to stay well-hydrated.    Patient is requesting a recheck of her thyroid levels.  Patient states she was in the ER a few days ago for a vasovagal event.  Patient states she has felt fine since then.  Denies chest pain or shortness of breath.  Denies palpitations or any dizziness since then.  Denies any other episodes of syncope.  Has been referred to cardiology and states that she has an appointment upcoming.   She states ER told her that her TSH was mildly elevated, but this could be an acute phase reactant.  She would like to have this rechecked in a few days.  States she regularly takes her levothyroxine.  Follows with endocrinology for Hashimotos.  She has an appointment with endocrinology in June.           Subjective      Review of Systems:   Review of Systems   Constitutional:  Negative for chills and fever.   Eyes:  Positive for photophobia.   Respiratory:  Negative for chest tightness and shortness of breath.    Cardiovascular:  Negative for chest pain and palpitations.   Gastrointestinal:  Positive for nausea and vomiting. Negative for abdominal pain.   Neurological:  Positive for headache. Negative for dizziness, syncope and light-headedness.       Past Medical History:   Past Medical History:   Diagnosis Date    Depression     for 10 mths in 2014    Hashimoto's thyroiditis 1/28/22    Hypothyroidism 10/19/20    Elevated TSH, but normal T4    Migraines 2018    Solitary thyroid nodule 05/16/2022    Thyroid nodule 2/8/22    11mm nodule left lobe       Past Surgical History:   Past Surgical History:   Procedure Laterality Date    WISDOM TOOTH EXTRACTION  01/01/2014       Family History:   Family History   Problem Relation Age of Onset    Depression Mother     Hyperlipidemia Mother     Thyroid disease Mother         hypothyroidism    Hypertension Mother     Depression Father     Hypertension Father     Thyroid disease Father         hypothyroidism    Breast cancer Paternal Grandmother     Ovarian cancer Neg Hx        Social History:   Social History     Socioeconomic History    Marital status:    Tobacco Use    Smoking status: Never     Passive exposure: Never    Smokeless tobacco: Never   Vaping Use    Vaping status: Never Used   Substance and Sexual Activity    Alcohol use: Yes     Alcohol/week: 1.0 standard drink of alcohol     Types: 1 Glasses of wine per week     Comment: occasionally    Drug use: No     "Sexual activity: Yes     Partners: Male     Birth control/protection: Condom, OCP       Medications:     Current Outpatient Medications:     Apri 0.15-30 MG-MCG per tablet, , Disp: , Rfl:     desogestrel-ethinyl estradiol (Apri) 0.15-30 MG-MCG per tablet, Take 1 tablet by mouth Daily., Disp: 84 tablet, Rfl: 2    levothyroxine (SYNTHROID, LEVOTHROID) 100 MCG tablet, TAKE 1 TABLET BY MOUTH DAILY, Disp: 90 tablet, Rfl: 1    Loratadine 10 MG capsule, Take  by mouth., Disp: , Rfl:     SUMAtriptan (IMITREX) 25 MG tablet, TAKE 1 TABLET BY MOUTH AT ONSET OF HEADACHE; MAY REPEAT 1 TABLET IN 2 HOURS IF NEEDED., Disp: 9 tablet, Rfl: 3    ubrogepant (Ubrelvy) 50 MG tablet, Take 1 tablet by mouth with onset of migraine. May repeat in 2 hours if needed. max 200 mg per 24 hours, Disp: 10 tablet, Rfl: 3    Allergies:   Allergies   Allergen Reactions    Penicillins        Objective     Physical Exam:  Vital Signs:   Vitals:    02/26/25 1239   BP: 120/78   BP Location: Right arm   Patient Position: Sitting   Cuff Size: Adult   Pulse: 80   Temp: 98.5 °F (36.9 °C)   TempSrc: Oral   SpO2: 100%   Weight: 56.9 kg (125 lb 6.4 oz)   Height: 160 cm (63\")     Body mass index is 22.21 kg/m².  BMI is within normal parameters. No other follow-up for BMI required.       Physical Exam  Vitals reviewed.   Constitutional:       Appearance: Normal appearance.   HENT:      Head: Normocephalic and atraumatic.   Eyes:      Pupils: Pupils are equal, round, and reactive to light.   Cardiovascular:      Rate and Rhythm: Normal rate and regular rhythm.      Pulses: Normal pulses.      Heart sounds: Normal heart sounds.   Pulmonary:      Effort: Pulmonary effort is normal.      Breath sounds: Normal breath sounds.   Abdominal:      General: Abdomen is flat. Bowel sounds are normal.   Skin:     General: Skin is warm.   Neurological:      General: No focal deficit present.      Mental Status: She is alert and oriented to person, place, and time.   Psychiatric: "         Mood and Affect: Mood normal.             Assessment / Plan      Assessment/Plan:   Diagnoses and all orders for this visit:    1. Encounter to establish care (Primary)    2. Migraine with aura and without status migrainosus, not intractable  -     SUMAtriptan (IMITREX) 25 MG tablet; TAKE 1 TABLET BY MOUTH AT ONSET OF HEADACHE; MAY REPEAT 1 TABLET IN 2 HOURS IF NEEDED.  Dispense: 9 tablet; Refill: 3  -     Discontinue: rimegepant sulfate ODT (Nurtec) 75 MG disintegrating tablet; Place 1 tablet under the tongue Every Other Day.  Dispense: 16 tablet; Refill: 3  -     ubrogepant (Ubrelvy) 50 MG tablet; Take 1 tablet by mouth with onset of migraine. May repeat in 2 hours if needed. max 200 mg per 24 hours  Dispense: 10 tablet; Refill: 3    3. Hypothyroidism due to Hashimoto's thyroiditis  -     TSH Rfx On Abnormal To Free T4; Future    Pleasant 29-year-old female who presents today to establish care.  Migraines somewhat well-controlled.  Would like to trial another medication.  Insurance denied Nurtec per patient request.  Will trial Ubrelvy as needed for migraines.  Patient instructed of potential side effects and adverse reactions of the medication.  Advised patient to take with onset of migraine.  If having persistent symptoms 2 hours after taking the medication she may take an additional dose.  If symptoms are persistent and not improving she was advised to seek evaluation in office or urgent care.  Advised not to take Imitrex and Ubrelvy together.  Patient to notify me of lack of improvement in symptoms or worsening migraines.    Plan to recheck TSH.  Patient to notify me of any new or worsening symptoms.  Encouraged follow-up with cardiology.    Follow Up:   No follow-ups on file.    Kala Saenz PA-C   [facility]

## 2025-02-26 NOTE — PROGRESS NOTES
Specialty Pharmacy Patient Management Program  Refill Outreach     Alal was contacted today regarding refills of their medication(s).                 Follow-up: 30 day(s)     Anjana Chen, Pharmacy Technician  2/26/2025  16:20 EST

## 2025-02-27 ENCOUNTER — SPECIALTY PHARMACY (OUTPATIENT)
Dept: PHARMACY | Facility: TELEHEALTH | Age: 30
End: 2025-02-27
Payer: COMMERCIAL

## 2025-02-27 NOTE — PROGRESS NOTES
Specialty Pharmacy Patient Management Program  Initial Assessment     Alla Waldron is a 29 y.o. female with migraines and enrolled in the Patient Management program offered by [facility]. An initial outreach was conducted, including assessment of therapy appropriateness and specialty medication education for Ubrelvy 50 mg tablet. The patient was introduced to services offered by [facility], including: regular assessments, refill coordination, curbside pick-up or mail order delivery options, prior authorization maintenance, and financial assistance programs as applicable. The patient was also provided with contact information for the pharmacy team.     Insurance Coverage & Financial Support  Affirmed and  copay card      Relevant Past Medical History and Comorbidities  Relevant medical history and concomitant health conditions were discussed with the patient. The patient's chart has been reviewed for relevant past medical history and comorbid health conditions and updated as necessary.   Past Medical History:   Diagnosis Date    Depression     for 10 mths in 2014    Hashimoto's thyroiditis 1/28/22    Hypothyroidism 10/19/20    Elevated TSH, but normal T4    Migraines 2018    Solitary thyroid nodule 05/16/2022    Thyroid nodule 2/8/22    11mm nodule left lobe     Social History     Socioeconomic History    Marital status:    Tobacco Use    Smoking status: Never     Passive exposure: Never    Smokeless tobacco: Never   Vaping Use    Vaping status: Never Used   Substance and Sexual Activity    Alcohol use: Yes     Alcohol/week: 1.0 standard drink of alcohol     Types: 1 Glasses of wine per week     Comment: occasionally    Drug use: No    Sexual activity: Yes     Partners: Male     Birth control/protection: Condom, OCP     Problem list reviewed by Ayde Woods, PharmD on 2/27/2025 at  1:58 PM    Allergies  Known allergies and reactions were  discussed with the patient. The patient's chart has been reviewed for allergy information and updated as necessary.   Penicillins  Allergies reviewed by Ayde Woods PharmD on 2/27/2025 at  1:58 PM    Current Medication List  This medication list has been reviewed with the patient and evaluated for any interactions or necessary modifications/recommendations, and updated to include all prescription medications, OTC medications, and supplements the patient is currently taking. This list reflects what is contained in the patient's profile, which has also been marked as reviewed to communicate to other providers it is the most up to date version of the patient's current medication therapy.     Current Outpatient Medications:     Apri 0.15-30 MG-MCG per tablet, , Disp: , Rfl:     desogestrel-ethinyl estradiol (Apri) 0.15-30 MG-MCG per tablet, Take 1 tablet by mouth Daily., Disp: 84 tablet, Rfl: 2    levothyroxine (SYNTHROID, LEVOTHROID) 100 MCG tablet, TAKE 1 TABLET BY MOUTH DAILY, Disp: 90 tablet, Rfl: 1    Loratadine 10 MG capsule, Take  by mouth., Disp: , Rfl:     SUMAtriptan (IMITREX) 25 MG tablet, TAKE 1 TABLET BY MOUTH AT ONSET OF HEADACHE; MAY REPEAT 1 TABLET IN 2 HOURS IF NEEDED., Disp: 9 tablet, Rfl: 3    ubrogepant (Ubrelvy) 50 MG tablet, Take 1 tablet by mouth with onset of migraine. May repeat in 2 hours if needed. max 200 mg per 24 hours, Disp: 10 tablet, Rfl: 3  No current facility-administered medications for this visit.  Medicines reviewed by Ayde Woods, PharmD on 2/27/2025 at  1:58 PM    Drug Interactions  none     Relevant Laboratory Values  Lab Results   Component Value Date    GLUCOSE 107 (H) 02/23/2025    CALCIUM 9.2 02/23/2025     02/23/2025    K 3.8 02/23/2025    CO2 21.3 (L) 02/23/2025     02/23/2025    BUN 10 02/23/2025    CREATININE 0.76 02/23/2025    BCR 13.2 02/23/2025    ANIONGAP 13.7 02/23/2025     Lab Results   Component Value Date    WBC 7.96 02/23/2025    HGB 14.2  02/23/2025    HCT 42.1 02/23/2025    MCV 88.3 02/23/2025     02/23/2025     Lab Value Review  The above lab values have been reviewed; the following specialty medication(s) dose adjustment(s) are recommended: none.    Initial Education Provided for Specialty Medication  The patient has been provided with the following education and any applicable administration techniques (i.e. self-injection) have been demonstrated for the therapies indicated. All questions and concerns have been addressed prior to the patient receiving the medication, and the patient has verbalized understanding of the education and any materials provided. Additional patient education shall be provided and documented upon request by the patient, provider or payer.      Ubrelvy (Ubrogepant)  Medication Expectations   Why am I taking this medication? You are taking this medication to treat acute migraines.   What should I expect while on this medication? You should expect to see a decrease in the frequency and severity of your migraines.   How does the medication work? Ubrelvy is a monoclonal antibody that binds to calcitonin gene-related peptide (CGRP) and blocks its binding to the receptor decreasing the severity of migraines.   How long will I be on this medication for? The amount of time you will be on this medication will be determined by your doctor and your response to the medication.    How do I take this medication? Take as directed on your prescription label.  Reviewed plan for Ubrelvy 100mg (1 tablet) PO daily prn; may repeat x 1 in 2 hours, if needed. Max dosage = 200mg/24 hours.    What are some possible side effects? Potential side effects including, but not limited to nausea and somnolence. Pt verbalized understanding.   What happens if I miss a dose? This is taken as needed for migraines.     Medication Safety   What are things I should warn my doctor immediately about? Allergic reaction: Itching or hives, swelling in your  face or hands, swelling or tingling in your mouth or throat, chest tightness, trouble breathing     What are things that I should be cautious of? Tell your doctor if you are pregnant or breastfeeding, or if you have kidney disease or liver disease.   What are some medications that can interact with this one? Concomitant use of strong CY inhibitors, check with your pharmacist or provider before starting any new medications, avoid grapefruit juice.     Medication Storage/Handling   How should I handle this medication? Keep this medication out of reach of pets/children.   How does this medication need to be stored? Store at a controlled room temperature between 20 and 25 degrees C (68 and 77 degrees F), with excursions permitted between 15 and 30 degrees C (59 and 86 degrees F) away from direct sunlight and moisture.   How should I dispose of this medication? There should not be a need to dispose of this medication unless your provider decides to change the dose or therapy. If that is the case, take to your local police station for proper disposal. Some pharmacies also have take-back bins for medication drop-off.      Resources/Support   How can I remind myself to take this medication? This is taken as needed for migraines.   Is financial support available?  Yes, [employer] can provide co-pay cards if you have commercial insurance or patient assistance if you have Medicare or no insurance.    Which vaccines are recommended for me? Talk to your doctor about these vaccines: Flu, Coronavirus (COVID-19), Pneumococcal (pneumonia), Tdap, Hepatitis B, Zoster (shingles)          Adherence, Self-Administration, and Current Therapy Problems  Adherence related to the patient's specialty therapy was discussed with the patient. The Adherence segment of this outreach has been reviewed and updated.          Additional Barriers to Patient Self-Administration: None  Methods for Supporting Patient Self-Administration:  N/A    Open Medication Therapy Problems  No medication therapy recommendations to display    Goals of Therapy   Goals Addressed Today        Specialty Pharmacy General Goal      Reduce duration and severity of migraines by at least 50%              Reassessment Plan & Follow-Up  Medication Therapy Changes: Patient to initiate Ubrelvy for acute treatment. Previously on Sumatriptan  Additional Plans, Therapy Recommendations, or Therapy Problems to Be Addressed: none   Pharmacist to perform regular reassessments no more than (6) months from the previous assessment.  Welcome information and patient satisfaction survey to be sent by retail team with patient's initial fill.  Care Coordinator to set up future refill outreaches, coordinate prescription delivery, and escalate clinical questions to pharmacist.     Attestation  I attest the patient was actively involved in and has agreed to the above plan of care. I attest that the initiated specialty medication(s) are appropriate for the patient based on my assessment. If the prescribed therapy is at any point deemed not appropriate based on the current or future assessments, a consultation will be initiated with the patient's specialty care provider to determine the best course of action. The revised plan of therapy will be documented along with any reassessments and/or additional patient education provided.     Electronically signed by Ayde Woods PharmD, 02/27/25, 1:59 PM EST.

## 2025-03-06 ENCOUNTER — LAB (OUTPATIENT)
Dept: LAB | Facility: HOSPITAL | Age: 30
End: 2025-03-06
Payer: COMMERCIAL

## 2025-03-06 DIAGNOSIS — E06.3 HYPOTHYROIDISM DUE TO HASHIMOTO'S THYROIDITIS: ICD-10-CM

## 2025-03-06 PROCEDURE — 84443 ASSAY THYROID STIM HORMONE: CPT

## 2025-03-07 ENCOUNTER — OFFICE VISIT (OUTPATIENT)
Dept: CARDIOLOGY | Facility: CLINIC | Age: 30
End: 2025-03-07
Payer: COMMERCIAL

## 2025-03-07 VITALS
WEIGHT: 123.8 LBS | HEART RATE: 89 BPM | BODY MASS INDEX: 22.78 KG/M2 | DIASTOLIC BLOOD PRESSURE: 78 MMHG | SYSTOLIC BLOOD PRESSURE: 116 MMHG | OXYGEN SATURATION: 99 % | HEIGHT: 62 IN

## 2025-03-07 DIAGNOSIS — R55 SYNCOPE AND COLLAPSE: Primary | ICD-10-CM

## 2025-03-07 LAB — TSH SERPL DL<=0.05 MIU/L-ACNC: 2.64 UIU/ML (ref 0.27–4.2)

## 2025-03-07 NOTE — PROGRESS NOTES
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    SUMAtriptan (IMITREX) 25 MG tablet TAKE 1 TABLET BY MOUTH AT ONSET OF HEADACHE; MAY REPEAT 1 TABLET IN 2 HOURS IF NEEDED.    ubrogepant (Ubrelvy) 50 MG tablet Take 1 tablet by mouth with onset of migraine. May repeat in 2 hours if needed. max 200 mg per 24 hours        History of Present Illness   HPI  Alla Waldron is a 29 y.o. year old female with the above mentioned PMH who presents for consult from [facility] ER for evaluation of syncope.  States that she had typical vasopressor episodes in the past but most recently prior to an ER visit she had an acute pain sat up in bed and then had debbie syncope.  This was witnessed and her significant other said it was approximately 30% seconds of lack of response  She does activities daily living exercise on occasion without issue otherwise.  She notes no over-the-counter concerning medications nor caffeine of excess.        ROS  Review of Systems   Cardiovascular:  Positive for syncope.       SOCIAL HX  Social History     Socioeconomic History    Marital status:    Tobacco Use    Smoking status: Never      "Passive exposure: Never    Smokeless tobacco: Never   Vaping Use    Vaping status: Never Used   Substance and Sexual Activity    Alcohol use: Yes     Alcohol/week: 1.0 standard drink of alcohol     Types: 1 Glasses of wine per week     Comment: occasionally    Drug use: No    Sexual activity: Yes     Partners: Male     Birth control/protection: Condom, OCP       FAMILY HX  Family History   Problem Relation Age of Onset    Depression Mother     Hyperlipidemia Mother     Thyroid disease Mother         hypothyroidism    Hypertension Mother     Depression Father     Hypertension Father     Thyroid disease Father         hypothyroidism    Breast cancer Paternal Grandmother     Ovarian cancer Neg Hx        OBJECTIVE:  Vitals:    03/07/25 1356   BP: 116/78   Pulse: 89   SpO2: 99%   Weight: 56.2 kg (123 lb 12.8 oz)   Height: 157.5 cm (62\")     Body mass index is 22.64 kg/m².     PHYSICAL EXAMINATION:  Constitutional:       Appearance: Healthy appearance. Not in distress.   Neck:      Vascular: No JVR. JVD normal.   Pulmonary:      Effort: Pulmonary effort is normal.      Breath sounds: Normal breath sounds. No wheezing. No rhonchi. No rales.   Chest:      Chest wall: Not tender to palpatation.   Cardiovascular:      PMI at left midclavicular line. Normal rate. Regular rhythm. Normal S1. Normal S2.       Murmurs: There is no murmur.      No gallop.  No click. No rub.   Pulses:     Intact distal pulses.   Edema:     Peripheral edema absent.   Abdominal:      General: Bowel sounds are normal.      Palpations: Abdomen is soft.      Tenderness: There is no abdominal tenderness.   Musculoskeletal: Normal range of motion.         General: No tenderness. Skin:     General: Skin is warm and dry.   Neurological:      General: No focal deficit present.      Mental Status: Alert and oriented to person, place and time.         Diagnostic Data:  Procedures  EKG from 2/23/2025 within normal limits  LABS:    Lab Results   Component Value " "Date    CHOL 156 04/19/2024    TRIG 110 04/19/2024    HDL 49 04/19/2024    LDL 87 04/19/2024      Lab Results   Component Value Date    GLUCOSE 107 (H) 02/23/2025    CALCIUM 9.2 02/23/2025     02/23/2025    K 3.8 02/23/2025    CO2 21.3 (L) 02/23/2025     02/23/2025    BUN 10 02/23/2025    CREATININE 0.76 02/23/2025    EGFR 108.9 02/23/2025    BCR 13.2 02/23/2025    ANIONGAP 13.7 02/23/2025      Lab Results   Component Value Date    WBC 7.96 02/23/2025    HGB 14.2 02/23/2025    HCT 42.1 02/23/2025    MCV 88.3 02/23/2025     02/23/2025     No results found for: \"HGBA1C\"   Lab Results   Component Value Date    TSH 2.640 03/06/2025          Advance Care Planning   ACP discussion was held with the patient during this visit. Patient has an advance directive (not in EMR), copy requested.         ASSESSMENT:     Diagnosis Plan   1. Syncope and collapse  Adult Transthoracic Echo Complete W/ Cont if Necessary Per Protocol    Holter Monitor - 48 Hour          PLAN:    Vasopressor handout afforded to her in the office today  Document 48-hour Holter monitor and echocardiogram                Demian More MD, FACC    "

## 2025-04-02 ENCOUNTER — SPECIALTY PHARMACY (OUTPATIENT)
Dept: PHARMACY | Facility: TELEHEALTH | Age: 30
End: 2025-04-02
Payer: COMMERCIAL

## 2025-04-02 NOTE — PROGRESS NOTES
Specialty Pharmacy Patient Management Program  Refill Outreach     Alla was contacted today regarding refills of their medication(s).    Refill Questions      Flowsheet Row Most Recent Value   Changes to allergies? No   Changes to medications? No   New conditions or infections since last clinic visit No   Unplanned office visit, urgent care, ED, or hospital admission in the last 4 weeks  No   How does patient/caregiver feel medication is working? Very good   Financial problems or insurance changes  No   Since the previous refill, were any specialty medication doses or scheduled injections missed or delayed?  No   Does this patient require a clinical escalation to a pharmacist? No            Delivery Questions      Flowsheet Row Most Recent Value   Delivery method [employer]   Delivery address verified with patient/caregiver? Yes   Delivery address Home   Other address preferred n/a   Number of medications in delivery 1   Medication(s) being filled and delivered Ubrogepant (UBRELVY)   Doses left of specialty medications 3   Copay verified? Yes   Copay amount 0   Copay form of payment No copayment ($0)   Delivery Date Selection 04/03/25   Signature Required No   Do you consent to receive electronic handouts?  Yes                 Follow-up: 30 day(s)     Anjana Chen, Pharmacy Technician  4/2/2025  09:18 EDT

## 2025-04-29 ENCOUNTER — SPECIALTY PHARMACY (OUTPATIENT)
Dept: PHARMACY | Facility: TELEHEALTH | Age: 30
End: 2025-04-29
Payer: COMMERCIAL

## 2025-04-29 NOTE — PROGRESS NOTES
Specialty Pharmacy Patient Management Program  Refill Outreach     Alla was contacted today regarding refills of their medication(s).    Refill Questions      Flowsheet Row Most Recent Value   Changes to allergies? No   Changes to medications? No   New conditions or infections since last clinic visit No   Unplanned office visit, urgent care, ED, or hospital admission in the last 4 weeks  No   How does patient/caregiver feel medication is working? Very good   Financial problems or insurance changes  No   Since the previous refill, were any specialty medication doses or scheduled injections missed or delayed?  No   Does this patient require a clinical escalation to a pharmacist? No            Delivery Questions      Flowsheet Row Most Recent Value   Delivery method UPS   Delivery address verified with patient/caregiver? Yes   Delivery address Home   Other address preferred N/A   Number of medications in delivery 1   Medication(s) being filled and delivered Ubrogepant (UBRELVY)   Doses left of specialty medications 1   Copay verified? Yes   Copay amount $0   Copay form of payment No copayment ($0)   Delivery Date Selection 04/30/25   Signature Required No                 Follow-up: 30 day(s)     Anjana Chen, Pharmacy Technician  4/29/2025  08:43 EDT

## 2025-05-15 ENCOUNTER — RESULTS FOLLOW-UP (OUTPATIENT)
Dept: ADMINISTRATIVE | Facility: HOSPITAL | Age: 30
End: 2025-05-15
Payer: COMMERCIAL

## 2025-05-15 NOTE — TELEPHONE ENCOUNTER
----- Message from Demian More sent at 5/15/2025 10:48 AM EDT -----  [facility] echo wnl  ----- Message -----  From: Parish Galvez MA  Sent: 5/15/2025  10:01 AM EDT  To: Demian More MD      Pt called of the above results, via voice mail .

## 2025-06-09 ENCOUNTER — SPECIALTY PHARMACY (OUTPATIENT)
Dept: PHARMACY | Facility: TELEHEALTH | Age: 30
End: 2025-06-09
Payer: COMMERCIAL

## 2025-07-02 DIAGNOSIS — G43.109 MIGRAINE WITH AURA AND WITHOUT STATUS MIGRAINOSUS, NOT INTRACTABLE: ICD-10-CM

## 2025-08-09 DIAGNOSIS — E06.3 HYPOTHYROIDISM DUE TO HASHIMOTO'S THYROIDITIS: ICD-10-CM

## 2025-08-11 RX ORDER — LEVOTHYROXINE SODIUM 100 UG/1
100 TABLET ORAL DAILY
Qty: 30 TABLET | Refills: 1 | Status: SHIPPED | OUTPATIENT
Start: 2025-08-11

## 2025-08-19 ENCOUNTER — SPECIALTY PHARMACY (OUTPATIENT)
Dept: PHARMACY | Facility: TELEHEALTH | Age: 30
End: 2025-08-19
Payer: COMMERCIAL